# Patient Record
Sex: FEMALE | Race: AMERICAN INDIAN OR ALASKA NATIVE | NOT HISPANIC OR LATINO | Employment: FULL TIME | URBAN - METROPOLITAN AREA
[De-identification: names, ages, dates, MRNs, and addresses within clinical notes are randomized per-mention and may not be internally consistent; named-entity substitution may affect disease eponyms.]

---

## 2022-08-15 ENCOUNTER — APPOINTMENT (INPATIENT)
Dept: RADIOLOGY | Facility: HOSPITAL | Age: 72
DRG: 637 | End: 2022-08-15
Payer: COMMERCIAL

## 2022-08-15 ENCOUNTER — HOSPITAL ENCOUNTER (INPATIENT)
Facility: HOSPITAL | Age: 72
LOS: 3 days | Discharge: HOME HEALTH | DRG: 637 | End: 2022-08-18
Attending: EMERGENCY MEDICINE | Admitting: ANESTHESIOLOGY
Payer: COMMERCIAL

## 2022-08-15 ENCOUNTER — APPOINTMENT (EMERGENCY)
Dept: CT IMAGING | Facility: HOSPITAL | Age: 72
DRG: 637 | End: 2022-08-15
Payer: COMMERCIAL

## 2022-08-15 DIAGNOSIS — Z79.4 TYPE 2 DIABETES MELLITUS WITH HYPERGLYCEMIA, WITH LONG-TERM CURRENT USE OF INSULIN (HCC): Primary | ICD-10-CM

## 2022-08-15 DIAGNOSIS — E11.10 DKA (DIABETIC KETOACIDOSIS) (HCC): ICD-10-CM

## 2022-08-15 DIAGNOSIS — E11.65 TYPE 2 DIABETES MELLITUS WITH HYPERGLYCEMIA, WITH LONG-TERM CURRENT USE OF INSULIN (HCC): Primary | ICD-10-CM

## 2022-08-15 PROBLEM — N18.31 STAGE 3A CHRONIC KIDNEY DISEASE (HCC): Status: ACTIVE | Noted: 2018-01-31

## 2022-08-15 PROBLEM — N18.9 ACUTE KIDNEY INJURY SUPERIMPOSED ON CHRONIC KIDNEY DISEASE (HCC): Status: ACTIVE | Noted: 2022-08-15

## 2022-08-15 PROBLEM — E11.69 HYPERLIPIDEMIA ASSOCIATED WITH TYPE 2 DIABETES MELLITUS (HCC): Status: ACTIVE | Noted: 2018-01-31

## 2022-08-15 PROBLEM — I15.2 HYPERTENSION ASSOCIATED WITH DIABETES (HCC): Status: ACTIVE | Noted: 2018-01-31

## 2022-08-15 PROBLEM — E11.59 HYPERTENSION ASSOCIATED WITH DIABETES (HCC): Status: ACTIVE | Noted: 2018-01-31

## 2022-08-15 PROBLEM — I35.1 AORTIC INSUFFICIENCY: Status: ACTIVE | Noted: 2018-01-31

## 2022-08-15 PROBLEM — E78.5 HYPERLIPIDEMIA ASSOCIATED WITH TYPE 2 DIABETES MELLITUS (HCC): Status: ACTIVE | Noted: 2018-01-31

## 2022-08-15 PROBLEM — N17.9 ACUTE KIDNEY INJURY SUPERIMPOSED ON CHRONIC KIDNEY DISEASE (HCC): Status: ACTIVE | Noted: 2022-08-15

## 2022-08-15 LAB
2HR DELTA HS TROPONIN: 6 NG/L
ALBUMIN SERPL BCP-MCNC: 3.8 G/DL (ref 3.5–5)
ALBUMIN SERPL BCP-MCNC: 4 G/DL (ref 3.5–5)
ALP SERPL-CCNC: 72 U/L (ref 46–116)
ALP SERPL-CCNC: 74 U/L (ref 46–116)
ALT SERPL W P-5'-P-CCNC: 19 U/L (ref 12–78)
ALT SERPL W P-5'-P-CCNC: 22 U/L (ref 12–78)
AMPHETAMINES SERPL QL SCN: NEGATIVE
ANION GAP SERPL CALCULATED.3IONS-SCNC: 20 MMOL/L (ref 4–13)
ANION GAP SERPL CALCULATED.3IONS-SCNC: 22 MMOL/L (ref 4–13)
AST SERPL W P-5'-P-CCNC: 10 U/L (ref 5–45)
AST SERPL W P-5'-P-CCNC: 19 U/L (ref 5–45)
BACTERIA UR QL AUTO: NORMAL /HPF
BARBITURATES UR QL: NEGATIVE
BASE EXCESS BLDA CALC-SCNC: -8 MMOL/L (ref -2–3)
BASOPHILS # BLD AUTO: 0.03 THOUSANDS/ΜL (ref 0–0.1)
BASOPHILS NFR BLD AUTO: 0 % (ref 0–1)
BENZODIAZ UR QL: NEGATIVE
BETA-HYDROXYBUTYRATE: 6.3 MMOL/L
BILIRUB SERPL-MCNC: 0.29 MG/DL (ref 0.2–1)
BILIRUB SERPL-MCNC: 0.49 MG/DL (ref 0.2–1)
BILIRUB UR QL STRIP: NEGATIVE
BUN SERPL-MCNC: 47 MG/DL (ref 5–25)
BUN SERPL-MCNC: 50 MG/DL (ref 5–25)
CA-I BLD-SCNC: 1.04 MMOL/L (ref 1.12–1.32)
CALCIUM SERPL-MCNC: 8.7 MG/DL (ref 8.3–10.1)
CALCIUM SERPL-MCNC: 9.7 MG/DL (ref 8.3–10.1)
CARDIAC TROPONIN I PNL SERPL HS: 15 NG/L
CARDIAC TROPONIN I PNL SERPL HS: 9 NG/L
CHLORIDE SERPL-SCNC: 100 MMOL/L (ref 96–108)
CHLORIDE SERPL-SCNC: 96 MMOL/L (ref 96–108)
CLARITY UR: CLEAR
CO2 SERPL-SCNC: 19 MMOL/L (ref 21–32)
CO2 SERPL-SCNC: 20 MMOL/L (ref 21–32)
COCAINE UR QL: NEGATIVE
COLOR UR: YELLOW
CREAT SERPL-MCNC: 2.15 MG/DL (ref 0.6–1.3)
CREAT SERPL-MCNC: 2.3 MG/DL (ref 0.6–1.3)
EOSINOPHIL # BLD AUTO: 0 THOUSAND/ΜL (ref 0–0.61)
EOSINOPHIL NFR BLD AUTO: 0 % (ref 0–6)
ERYTHROCYTE [DISTWIDTH] IN BLOOD BY AUTOMATED COUNT: 13 % (ref 11.6–15.1)
EST. AVERAGE GLUCOSE BLD GHB EST-MCNC: 298 MG/DL
GFR SERPL CREATININE-BSD FRML MDRD: 20 ML/MIN/1.73SQ M
GFR SERPL CREATININE-BSD FRML MDRD: 22 ML/MIN/1.73SQ M
GLUCOSE SERPL-MCNC: 207 MG/DL (ref 65–140)
GLUCOSE SERPL-MCNC: 213 MG/DL (ref 65–140)
GLUCOSE SERPL-MCNC: 219 MG/DL (ref 65–140)
GLUCOSE SERPL-MCNC: 279 MG/DL (ref 65–140)
GLUCOSE SERPL-MCNC: 326 MG/DL (ref 65–140)
GLUCOSE SERPL-MCNC: 372 MG/DL (ref 65–140)
GLUCOSE SERPL-MCNC: 517 MG/DL (ref 65–140)
GLUCOSE SERPL-MCNC: 833 MG/DL (ref 65–140)
GLUCOSE SERPL-MCNC: >500 MG/DL (ref 65–140)
GLUCOSE SERPL-MCNC: >500 MG/DL (ref 65–140)
GLUCOSE SERPL-MCNC: >600 MG/DL (ref 65–140)
GLUCOSE UR STRIP-MCNC: ABNORMAL MG/DL
HBA1C MFR BLD: 12 %
HCO3 BLDA-SCNC: 17.6 MMOL/L (ref 24–30)
HCT VFR BLD AUTO: 44.8 % (ref 34.8–46.1)
HCT VFR BLD CALC: 37 % (ref 34.8–46.1)
HGB BLD-MCNC: 14 G/DL (ref 11.5–15.4)
HGB BLDA-MCNC: 12.6 G/DL (ref 11.5–15.4)
HGB UR QL STRIP.AUTO: ABNORMAL
IMM GRANULOCYTES # BLD AUTO: 0.04 THOUSAND/UL (ref 0–0.2)
IMM GRANULOCYTES NFR BLD AUTO: 0 % (ref 0–2)
INR PPP: 1.27 (ref 0.84–1.19)
KETONES UR STRIP-MCNC: ABNORMAL MG/DL
LEUKOCYTE ESTERASE UR QL STRIP: ABNORMAL
LYMPHOCYTES # BLD AUTO: 1.03 THOUSANDS/ΜL (ref 0.6–4.47)
LYMPHOCYTES NFR BLD AUTO: 9 % (ref 14–44)
MAGNESIUM SERPL-MCNC: 2.9 MG/DL (ref 1.6–2.6)
MCH RBC QN AUTO: 27.9 PG (ref 26.8–34.3)
MCHC RBC AUTO-ENTMCNC: 31.3 G/DL (ref 31.4–37.4)
MCV RBC AUTO: 89 FL (ref 82–98)
METHADONE UR QL: NEGATIVE
MONOCYTES # BLD AUTO: 0.21 THOUSAND/ΜL (ref 0.17–1.22)
MONOCYTES NFR BLD AUTO: 2 % (ref 4–12)
NEUTROPHILS # BLD AUTO: 9.93 THOUSANDS/ΜL (ref 1.85–7.62)
NEUTS SEG NFR BLD AUTO: 89 % (ref 43–75)
NITRITE UR QL STRIP: NEGATIVE
NON-SQ EPI CELLS URNS QL MICRO: NORMAL /HPF
NRBC BLD AUTO-RTO: 0 /100 WBCS
OPIATES UR QL SCN: NEGATIVE
OXYCODONE+OXYMORPHONE UR QL SCN: NEGATIVE
PCO2 BLD: 19 MMOL/L (ref 21–32)
PCO2 BLD: 36 MM HG (ref 42–50)
PCP UR QL: NEGATIVE
PH BLD: 7.3 [PH] (ref 7.3–7.4)
PH UR STRIP.AUTO: 5 [PH]
PHOSPHATE SERPL-MCNC: 4.9 MG/DL (ref 2.3–4.1)
PLATELET # BLD AUTO: 179 THOUSANDS/UL (ref 149–390)
PO2 BLD: 37 MM HG (ref 35–45)
POTASSIUM BLD-SCNC: 4.8 MMOL/L (ref 3.5–5.3)
POTASSIUM SERPL-SCNC: 3.9 MMOL/L (ref 3.5–5.3)
POTASSIUM SERPL-SCNC: 4.7 MMOL/L (ref 3.5–5.3)
PROCALCITONIN SERPL-MCNC: 0.44 NG/ML
PROT SERPL-MCNC: 8 G/DL (ref 6.4–8.4)
PROT SERPL-MCNC: 8.5 G/DL (ref 6.4–8.4)
PROT UR STRIP-MCNC: NEGATIVE MG/DL
PROTHROMBIN TIME: 15.6 SECONDS (ref 11.6–14.5)
RBC # BLD AUTO: 5.01 MILLION/UL (ref 3.81–5.12)
RBC #/AREA URNS AUTO: NORMAL /HPF
SAO2 % BLD FROM PO2: 65 % (ref 60–85)
SODIUM BLD-SCNC: 137 MMOL/L (ref 136–145)
SODIUM SERPL-SCNC: 137 MMOL/L (ref 135–147)
SODIUM SERPL-SCNC: 140 MMOL/L (ref 135–147)
SP GR UR STRIP.AUTO: 1.02 (ref 1–1.03)
SPECIMEN SOURCE: ABNORMAL
THC UR QL: NEGATIVE
UROBILINOGEN UR QL STRIP.AUTO: 0.2 E.U./DL
WBC # BLD AUTO: 11.24 THOUSAND/UL (ref 4.31–10.16)
WBC #/AREA URNS AUTO: NORMAL /HPF

## 2022-08-15 PROCEDURE — 80053 COMPREHEN METABOLIC PANEL: CPT | Performed by: EMERGENCY MEDICINE

## 2022-08-15 PROCEDURE — 85610 PROTHROMBIN TIME: CPT | Performed by: EMERGENCY MEDICINE

## 2022-08-15 PROCEDURE — 80053 COMPREHEN METABOLIC PANEL: CPT | Performed by: ANESTHESIOLOGY

## 2022-08-15 PROCEDURE — 82947 ASSAY GLUCOSE BLOOD QUANT: CPT

## 2022-08-15 PROCEDURE — 93005 ELECTROCARDIOGRAM TRACING: CPT

## 2022-08-15 PROCEDURE — 83735 ASSAY OF MAGNESIUM: CPT | Performed by: EMERGENCY MEDICINE

## 2022-08-15 PROCEDURE — 82948 REAGENT STRIP/BLOOD GLUCOSE: CPT

## 2022-08-15 PROCEDURE — 84132 ASSAY OF SERUM POTASSIUM: CPT

## 2022-08-15 PROCEDURE — 70450 CT HEAD/BRAIN W/O DYE: CPT

## 2022-08-15 PROCEDURE — 82010 KETONE BODYS QUAN: CPT | Performed by: EMERGENCY MEDICINE

## 2022-08-15 PROCEDURE — 99291 CRITICAL CARE FIRST HOUR: CPT | Performed by: ANESTHESIOLOGY

## 2022-08-15 PROCEDURE — 80307 DRUG TEST PRSMV CHEM ANLYZR: CPT | Performed by: ANESTHESIOLOGY

## 2022-08-15 PROCEDURE — 85014 HEMATOCRIT: CPT

## 2022-08-15 PROCEDURE — 96365 THER/PROPH/DIAG IV INF INIT: CPT

## 2022-08-15 PROCEDURE — 71045 X-RAY EXAM CHEST 1 VIEW: CPT

## 2022-08-15 PROCEDURE — 81001 URINALYSIS AUTO W/SCOPE: CPT | Performed by: EMERGENCY MEDICINE

## 2022-08-15 PROCEDURE — 84295 ASSAY OF SERUM SODIUM: CPT

## 2022-08-15 PROCEDURE — 83036 HEMOGLOBIN GLYCOSYLATED A1C: CPT | Performed by: EMERGENCY MEDICINE

## 2022-08-15 PROCEDURE — 96361 HYDRATE IV INFUSION ADD-ON: CPT

## 2022-08-15 PROCEDURE — 96374 THER/PROPH/DIAG INJ IV PUSH: CPT

## 2022-08-15 PROCEDURE — 84145 PROCALCITONIN (PCT): CPT | Performed by: ANESTHESIOLOGY

## 2022-08-15 PROCEDURE — 99285 EMERGENCY DEPT VISIT HI MDM: CPT

## 2022-08-15 PROCEDURE — 84484 ASSAY OF TROPONIN QUANT: CPT | Performed by: EMERGENCY MEDICINE

## 2022-08-15 PROCEDURE — 84100 ASSAY OF PHOSPHORUS: CPT | Performed by: EMERGENCY MEDICINE

## 2022-08-15 PROCEDURE — 36415 COLL VENOUS BLD VENIPUNCTURE: CPT | Performed by: EMERGENCY MEDICINE

## 2022-08-15 PROCEDURE — 85025 COMPLETE CBC W/AUTO DIFF WBC: CPT | Performed by: EMERGENCY MEDICINE

## 2022-08-15 PROCEDURE — 99285 EMERGENCY DEPT VISIT HI MDM: CPT | Performed by: EMERGENCY MEDICINE

## 2022-08-15 PROCEDURE — 82803 BLOOD GASES ANY COMBINATION: CPT

## 2022-08-15 PROCEDURE — 82330 ASSAY OF CALCIUM: CPT

## 2022-08-15 RX ORDER — HEPARIN SODIUM 5000 [USP'U]/ML
5000 INJECTION, SOLUTION INTRAVENOUS; SUBCUTANEOUS EVERY 8 HOURS SCHEDULED
Status: DISCONTINUED | OUTPATIENT
Start: 2022-08-15 | End: 2022-08-17

## 2022-08-15 RX ORDER — ENALAPRIL MALEATE 20 MG/1
20 TABLET ORAL DAILY
COMMUNITY

## 2022-08-15 RX ORDER — SODIUM CHLORIDE, SODIUM GLUCONATE, SODIUM ACETATE, POTASSIUM CHLORIDE, MAGNESIUM CHLORIDE, SODIUM PHOSPHATE, DIBASIC, AND POTASSIUM PHOSPHATE .53; .5; .37; .037; .03; .012; .00082 G/100ML; G/100ML; G/100ML; G/100ML; G/100ML; G/100ML; G/100ML
1000 INJECTION, SOLUTION INTRAVENOUS ONCE
Status: COMPLETED | OUTPATIENT
Start: 2022-08-15 | End: 2022-08-15

## 2022-08-15 RX ORDER — HYDROCHLOROTHIAZIDE 12.5 MG/1
12.5 CAPSULE, GELATIN COATED ORAL DAILY
COMMUNITY

## 2022-08-15 RX ORDER — SODIUM CHLORIDE 9 MG/ML
3 INJECTION INTRAVENOUS
Status: DISCONTINUED | OUTPATIENT
Start: 2022-08-15 | End: 2022-08-18 | Stop reason: HOSPADM

## 2022-08-15 RX ORDER — AMLODIPINE BESYLATE 10 MG/1
10 TABLET ORAL DAILY
COMMUNITY

## 2022-08-15 RX ORDER — AMLODIPINE BESYLATE 10 MG/1
10 TABLET ORAL DAILY
Status: DISCONTINUED | OUTPATIENT
Start: 2022-08-16 | End: 2022-08-18 | Stop reason: HOSPADM

## 2022-08-15 RX ORDER — DEXTROSE AND SODIUM CHLORIDE 5; .9 G/100ML; G/100ML
250 INJECTION, SOLUTION INTRAVENOUS CONTINUOUS
Status: DISCONTINUED | OUTPATIENT
Start: 2022-08-15 | End: 2022-08-15

## 2022-08-15 RX ORDER — CARVEDILOL 12.5 MG/1
25 TABLET ORAL 2 TIMES DAILY WITH MEALS
Status: DISCONTINUED | OUTPATIENT
Start: 2022-08-15 | End: 2022-08-18 | Stop reason: HOSPADM

## 2022-08-15 RX ORDER — CARVEDILOL PHOSPHATE 10 MG/1
25 CAPSULE, EXTENDED RELEASE ORAL 2 TIMES DAILY
COMMUNITY

## 2022-08-15 RX ORDER — HYDRALAZINE HYDROCHLORIDE 20 MG/ML
5 INJECTION INTRAMUSCULAR; INTRAVENOUS EVERY 6 HOURS PRN
Status: DISCONTINUED | OUTPATIENT
Start: 2022-08-15 | End: 2022-08-15

## 2022-08-15 RX ORDER — SIMVASTATIN 20 MG
20 TABLET ORAL
COMMUNITY

## 2022-08-15 RX ORDER — HYDRALAZINE HYDROCHLORIDE 20 MG/ML
5 INJECTION INTRAMUSCULAR; INTRAVENOUS EVERY 6 HOURS PRN
Status: DISCONTINUED | OUTPATIENT
Start: 2022-08-15 | End: 2022-08-18 | Stop reason: HOSPADM

## 2022-08-15 RX ORDER — PRAVASTATIN SODIUM 40 MG
40 TABLET ORAL
Status: DISCONTINUED | OUTPATIENT
Start: 2022-08-15 | End: 2022-08-18 | Stop reason: HOSPADM

## 2022-08-15 RX ORDER — AMLODIPINE BESYLATE 5 MG/1
5 TABLET ORAL DAILY
Status: DISCONTINUED | OUTPATIENT
Start: 2022-08-16 | End: 2022-08-15

## 2022-08-15 RX ORDER — DEXTROSE AND SODIUM CHLORIDE 5; .45 G/100ML; G/100ML
150 INJECTION, SOLUTION INTRAVENOUS CONTINUOUS
Status: DISCONTINUED | OUTPATIENT
Start: 2022-08-15 | End: 2022-08-16

## 2022-08-15 RX ORDER — SODIUM CHLORIDE, SODIUM LACTATE, POTASSIUM CHLORIDE, CALCIUM CHLORIDE 600; 310; 30; 20 MG/100ML; MG/100ML; MG/100ML; MG/100ML
250 INJECTION, SOLUTION INTRAVENOUS CONTINUOUS
Status: DISCONTINUED | OUTPATIENT
Start: 2022-08-15 | End: 2022-08-16

## 2022-08-15 RX ORDER — SODIUM CHLORIDE, SODIUM LACTATE, POTASSIUM CHLORIDE, CALCIUM CHLORIDE 600; 310; 30; 20 MG/100ML; MG/100ML; MG/100ML; MG/100ML
500 INJECTION, SOLUTION INTRAVENOUS CONTINUOUS
Status: DISCONTINUED | OUTPATIENT
Start: 2022-08-15 | End: 2022-08-15

## 2022-08-15 RX ADMIN — HEPARIN SODIUM 5000 UNITS: 5000 INJECTION INTRAVENOUS; SUBCUTANEOUS at 16:55

## 2022-08-15 RX ADMIN — SODIUM CHLORIDE 1000 ML: 0.9 INJECTION, SOLUTION INTRAVENOUS at 14:07

## 2022-08-15 RX ADMIN — SODIUM CHLORIDE 6.9 UNITS/HR: 9 INJECTION, SOLUTION INTRAVENOUS at 16:52

## 2022-08-15 RX ADMIN — SODIUM CHLORIDE, SODIUM GLUCONATE, SODIUM ACETATE, POTASSIUM CHLORIDE, MAGNESIUM CHLORIDE, SODIUM PHOSPHATE, DIBASIC, AND POTASSIUM PHOSPHATE 1000 ML: .53; .5; .37; .037; .03; .012; .00082 INJECTION, SOLUTION INTRAVENOUS at 14:07

## 2022-08-15 RX ADMIN — SODIUM CHLORIDE, SODIUM LACTATE, POTASSIUM CHLORIDE, AND CALCIUM CHLORIDE 500 ML/HR: 600; 310; 30; 20 INJECTION, SOLUTION INTRAVENOUS at 17:01

## 2022-08-15 RX ADMIN — SODIUM CHLORIDE, SODIUM LACTATE, POTASSIUM CHLORIDE, AND CALCIUM CHLORIDE 500 ML/HR: 600; 310; 30; 20 INJECTION, SOLUTION INTRAVENOUS at 19:57

## 2022-08-15 RX ADMIN — SODIUM CHLORIDE 6.9 UNITS/HR: 9 INJECTION, SOLUTION INTRAVENOUS at 15:34

## 2022-08-15 RX ADMIN — PRAVASTATIN SODIUM 40 MG: 40 TABLET ORAL at 16:54

## 2022-08-15 RX ADMIN — DIBASIC SODIUM PHOSPHATE, MONOBASIC POTASSIUM PHOSPHATE AND MONOBASIC SODIUM PHOSPHATE 1 TABLET: 852; 155; 130 TABLET ORAL at 18:19

## 2022-08-15 RX ADMIN — DEXTROSE AND SODIUM CHLORIDE 250 ML/HR: 5; .45 INJECTION, SOLUTION INTRAVENOUS at 21:55

## 2022-08-15 RX ADMIN — CARVEDILOL 25 MG: 12.5 TABLET, FILM COATED ORAL at 16:54

## 2022-08-15 NOTE — ASSESSMENT & PLAN NOTE
Lab Results   Component Value Date    EGFR 20 08/15/2022    CREATININE 2 30 (H) 08/15/2022   Baseline Cr 1 21 (July 2022)     Likely due to diabetic emergency, continue hydration monitor urine output, acid-base status, and check renal US if does not improve with hydration  Hold ACE, diuretic

## 2022-08-15 NOTE — PLAN OF CARE
Problem: Potential for Falls  Goal: Patient will remain free of falls  Description: INTERVENTIONS:  - Educate patient/family on patient safety including physical limitations  - Instruct patient to call for assistance with activity   - Consult OT/PT to assist with strengthening/mobility   - Keep Call bell within reach  - Keep bed low and locked with side rails adjusted as appropriate  - Keep care items and personal belongings within reach  - Initiate and maintain comfort rounds  - Make Fall Risk Sign visible to staff  - Offer Toileting every 3 Hours, in advance of need  - Initiate/Maintain bed/chair alarm  - Obtain necessary fall risk management equipment:  - Apply yellow socks and bracelet for high fall risk patients  - Consider moving patient to room near nurses station  Outcome: Progressing     Problem: PAIN - ADULT  Goal: Verbalizes/displays adequate comfort level or baseline comfort level  Description: Interventions:  - Encourage patient to monitor pain and request assistance  - Assess pain using appropriate pain scale  - Administer analgesics based on type and severity of pain and evaluate response  - Implement non-pharmacological measures as appropriate and evaluate response  - Consider cultural and social influences on pain and pain management  - Notify physician/advanced practitioner if interventions unsuccessful or patient reports new pain  Outcome: Progressing     Problem: INFECTION - ADULT  Goal: Absence or prevention of progression during hospitalization  Description: INTERVENTIONS:  - Assess and monitor for signs and symptoms of infection  - Monitor lab/diagnostic results  - Monitor all insertion sites, i e  indwelling lines, tubes, and drains  - Monitor endotracheal if appropriate and nasal secretions for changes in amount and color  - [place] appropriate cooling/warming therapies per order  - Administer medications as ordered  - Instruct and encourage patient and family to use good hand hygiene technique  - Identify and instruct in appropriate isolation precautions for identified infection/condition  Outcome: Progressing     Problem: SAFETY ADULT  Goal: Patient will remain free of falls  Description: INTERVENTIONS:  - Educate patient/family on patient safety including physical limitations  - Instruct patient to call for assistance with activity   - Consult OT/PT to assist with strengthening/mobility   - Keep Call bell within reach  - Keep bed low and locked with side rails adjusted as appropriate  - Keep care items and personal belongings within reach  - Initiate and maintain comfort rounds  - Make Fall Risk Sign visible to staff  - Offer Toileting every 3Hours, in advance of need  - Initiate/Maintain bed/chair alarm  - Obtain necessary fall risk management equipment:  - Apply yellow socks and bracelet for high fall risk patients  - Consider moving patient to room near nurses station  Outcome: Progressing  Goal: Maintain or return to baseline ADL function  Description: INTERVENTIONS:  -  Assess patient's ability to carry out ADLs; assess patient's baseline for ADL function and identify physical deficits which impact ability to perform ADLs (bathing, care of mouth/teeth, toileting, grooming, dressing, etc )  - Assess/evaluate cause of self-care deficits   - Assess range of motion  - Assess patient's mobility; develop plan if impaired  - Assess patient's need for assistive devices and provide as appropriate  - Encourage maximum independence but intervene and supervise when necessary  - Involve family in performance of ADLs  - Assess for home care needs following discharge   - Consider OT consult to assist with ADL evaluation and planning for discharge  - Provide patient education as appropriate  Outcome: Progressing  Goal: Maintains/Returns to pre admission functional level  Description: INTERVENTIONS:  - Perform BMAT or MOVE assessment daily    - Set and communicate daily mobility goal to care team and patient/family/caregiver  - Collaborate with rehabilitation services on mobility goals if consulted  - Reposition patient every 2 hours  - Dangle patient 3 times a day  - Stand patient 3 times a day  - Ambulate patient 3 times a day  - Out of bed to chair 3 times a day   - Out of bed for meals 3 times a day  - Out of bed for toileting  - Record patient progress and toleration of activity level   Outcome: Progressing     Problem: DISCHARGE PLANNING  Goal: Discharge to home or other facility with appropriate resources  Description: INTERVENTIONS:  - Identify barriers to discharge w/patient and caregiver  - Arrange for needed discharge resources and transportation as appropriate  - Identify discharge learning needs (meds, wound care, etc )  - Arrange for interpretive services to assist at discharge as needed  - Refer to Case Management Department for coordinating discharge planning if the patient needs post-hospital services based on physician/advanced practitioner order or complex needs related to functional status, cognitive ability, or social support system  Outcome: Progressing     Problem: Knowledge Deficit  Goal: Patient/family/caregiver demonstrates understanding of disease process, treatment plan, medications, and discharge instructions  Description: Complete learning assessment and assess knowledge base    Interventions:  - Provide teaching at level of understanding  - Provide teaching via preferred learning methods  Outcome: Progressing     Problem: METABOLIC, FLUID AND ELECTROLYTES - ADULT  Goal: Glucose maintained within target range  Description: INTERVENTIONS:  - Monitor Blood Glucose as ordered  - Assess for signs and symptoms of hyperglycemia and hypoglycemia  - Administer ordered medications to maintain glucose within target range  - Assess nutritional intake and initiate nutrition service referral as needed  Outcome: Progressing

## 2022-08-15 NOTE — ED PROVIDER NOTES
History  Chief Complaint   Patient presents with    Hyperglycemia - Symptomatic     Patient arrived by EMS for hyperglycemia  Patient was diagnosed as a diabetic last week and just started oral medications  Patient unsure of medication  66year old female presents emergency department for evaluation of altered mental status, generalized weakness and fatigue with a known new diagnosis of diabetes  The patient is being treated currently with one 500 mg dose of Glucophage daily  Patient was found to be hyperglycemic at home, increasingly weak, near syncopal and brought here to the emergency department  Patient is likely in DKA  History provided by:  Patient   used: No    Hyperglycemia - Symptomatic  Severity:  Mild  Onset quality:  Gradual  Timing:  Constant  Progression:  Worsening  Chronicity:  New  Context: new diabetes diagnosis    Relieved by:  Nothing  Ineffective treatments:  None tried  Associated symptoms: no chest pain, no dehydration, no fatigue, no increased appetite and no syncope        Prior to Admission Medications   Prescriptions Last Dose Informant Patient Reported? Taking? amLODIPine (NORVASC) 10 mg tablet   Yes Yes   Sig: Take 10 mg by mouth daily   carvedilol (COREG CR) 10 MG 24 hr capsule   Yes Yes   Sig: Take 25 mg by mouth 2 (two) times a day   enalapril (VASOTEC) 20 mg tablet   Yes Yes   Sig: Take 20 mg by mouth daily   hydrochlorothiazide (MICROZIDE) 12 5 mg capsule   Yes Yes   Sig: Take 12 5 mg by mouth daily   metFORMIN (GLUCOPHAGE) 500 mg tablet   Yes Yes   Sig: Take 500 mg by mouth in the morning   simvastatin (ZOCOR) 20 mg tablet   Yes Yes   Sig: Take 20 mg by mouth daily at bedtime      Facility-Administered Medications: None       Past Medical History:   Diagnosis Date    Diabetes mellitus ([facility] [address] )     Hypertension        History reviewed  No pertinent surgical history  History reviewed  No pertinent family history    I have reviewed and agree with the history as documented  E-Cigarette/Vaping    E-Cigarette Use Never User      E-Cigarette/Vaping Substances     Social History     Tobacco Use    Smoking status: Never Smoker    Smokeless tobacco: Never Used   Vaping Use    Vaping Use: Never used   Substance Use Topics    Alcohol use: Never    Drug use: Never       Review of Systems   Constitutional: Negative for fatigue  Cardiovascular: Negative for chest pain and syncope  All other systems reviewed and are negative  Physical Exam  Physical Exam  Vitals and nursing note reviewed  Constitutional:       Appearance: She is well-developed  HENT:      Head: Normocephalic and atraumatic  Right Ear: External ear normal       Left Ear: External ear normal    Eyes:      Conjunctiva/sclera: Conjunctivae normal    Neck:      Thyroid: No thyromegaly  Vascular: No JVD  Trachea: No tracheal deviation  Cardiovascular:      Rate and Rhythm: Normal rate  Pulmonary:      Effort: Pulmonary effort is normal       Breath sounds: Normal breath sounds  No stridor  Abdominal:      General: There is no distension  Palpations: Abdomen is soft  There is no mass  Tenderness: There is no abdominal tenderness  There is no guarding  Hernia: No hernia is present  Musculoskeletal:         General: No tenderness or deformity  Normal range of motion  Lymphadenopathy:      Cervical: No cervical adenopathy  Skin:     General: Skin is warm  Coloration: Skin is not pale  Findings: No erythema or rash  Neurological:      Mental Status: She is alert and oriented to person, place, and time     Psychiatric:         Behavior: Behavior normal          Vital Signs  ED Triage Vitals   Temperature Pulse Respirations Blood Pressure SpO2   08/15/22 1348 08/15/22 1343 08/15/22 1343 08/15/22 1343 08/15/22 1343   97 9 °F (36 6 °C) 76 18 (!) 190/90 97 %      Temp Source Heart Rate Source Patient Position - Orthostatic VS BP Location FiO2 (%) 08/15/22 1738 08/15/22 1541 08/15/22 1541 08/15/22 1541 --   Oral Monitor Lying Left arm       Pain Score       08/15/22 1738       No Pain           Vitals:    08/17/22 0500 08/17/22 0845 08/17/22 1100 08/17/22 1500   BP: 160/70 162/72 135/64 166/74   Pulse: 63 63 61 70   Patient Position - Orthostatic VS: Lying  Sitting Lying         Visual Acuity      ED Medications  Medications   sodium chloride (PF) 0 9 % injection 3 mL (has no administration in time range)   carvedilol (COREG) tablet 25 mg (25 mg Oral Given 8/17/22 0846)   pravastatin (PRAVACHOL) tablet 40 mg (40 mg Oral Given 8/16/22 1558)   potassium-sodium phosphateS (K-PHOS,PHOSPHA 250) -250 mg tablet 1 tablet (1 tablet Oral Given 8/17/22 0845)   heparin (porcine) subcutaneous injection 5,000 Units (5,000 Units Subcutaneous Given 8/17/22 1426)   amLODIPine (NORVASC) tablet 10 mg (10 mg Oral Given 8/17/22 0845)   hydrALAZINE (APRESOLINE) injection 5 mg (has no administration in time range)   insulin regular (HumuLIN R,NovoLIN R) 1 Units/mL in sodium chloride 0 9 % 100 mL infusion (0 Units/hr Intravenous Stopped 8/16/22 2356)   glimepiride (AMARYL) tablet 4 mg (4 mg Oral Given 8/17/22 0845)   insulin glargine (LANTUS) subcutaneous injection 15 Units 0 15 mL (15 Units Subcutaneous Given 8/16/22 2158)   insulin lispro (HumaLOG) 100 units/mL subcutaneous injection 1-5 Units (3 Units Subcutaneous Given 8/17/22 1201)   insulin lispro (HumaLOG) 100 units/mL subcutaneous injection 1-5 Units (has no administration in time range)   sodium chloride 0 9 % bolus 1,000 mL (0 mL Intravenous Stopped 8/15/22 1530)   multi-electrolyte (ISOLYTE-S PH 7 4) bolus 1,000 mL (0 mL Intravenous Stopped 8/15/22 1530)   potassium chloride 20 mEq IVPB (premix) (20 mEq Intravenous New Bag 8/16/22 0258)   calcium gluconate 2 g in sodium chloride 0 9% 100 mL (premix) (2 g Intravenous New Bag 8/16/22 8668)       Diagnostic Studies  Results Reviewed     Procedure Component Value Units Date/Time    Basic metabolic panel [[id number]]  (Abnormal) Collected: 08/16/22 0005    Lab Status: Final result Specimen: Blood from Hand, Left Updated: 08/16/22 0035     Sodium 143 mmol/L      Potassium 3 3 mmol/L      Chloride 109 mmol/L      CO2 26 mmol/L      ANION GAP 8 mmol/L      BUN 37 mg/dL      Creatinine 1 56 mg/dL      Glucose 209 mg/dL      Calcium 8 5 mg/dL      eGFR 32 ml/min/1 73sq m     Narrative:      [place] guidelines for Chronic Kidney Disease (CKD):     Stage 1 with normal or high GFR (GFR > 90 mL/min/1 73 square meters)    Stage 2 Mild CKD (GFR = 60-89 mL/min/1 73 square meters)    Stage 3A Moderate CKD (GFR = 45-59 mL/min/1 73 square meters)    Stage 3B Moderate CKD (GFR = 30-44 mL/min/1 73 square meters)    Stage 4 Severe CKD (GFR = 15-29 mL/min/1 73 square meters)    Stage 5 End Stage CKD (GFR <15 mL/min/1 73 square meters)  Note: GFR calculation is accurate only with a steady state creatinine    Phosphorus [[id number]]  (Normal) Collected: 08/16/22 0005    Lab Status: Final result Specimen: Blood from Hand, Left Updated: 08/16/22 0035     Phosphorus 2 8 mg/dL     Magnesium [[id number]]  (Abnormal) Collected: 08/16/22 0005    Lab Status: Final result Specimen: Blood from Hand, Left Updated: 08/16/22 0035     Magnesium 2 7 mg/dL     Hemoglobin A1c w/EAG Estimation [[id number]]  (Abnormal) Collected: 08/15/22 1614    Lab Status: Final result Specimen: Blood from Arm, Left Updated: 08/15/22 2046     Hemoglobin A1C 12 0 %       mg/dl     Procalcitonin [[id number]]  (Abnormal) Collected: 08/15/22 1657    Lab Status: Final result Specimen: Blood from Arm, Left Updated: 08/15/22 1740     Procalcitonin 0 44 ng/ml     Comprehensive metabolic panel [[id number]]  (Abnormal) Collected: 08/15/22 1657    Lab Status: Final result Specimen: Blood from Arm, Left Updated: 08/15/22 1735     Sodium 140 mmol/L      Potassium 3 9 mmol/L      Chloride 100 mmol/L      CO2 20 mmol/L      ANION GAP 20 mmol/L      BUN 47 mg/dL      Creatinine 2 15 mg/dL      Glucose 517 mg/dL      Calcium 8 7 mg/dL      AST 19 U/L      ALT 22 U/L      Alkaline Phosphatase 72 U/L      Total Protein 8 0 g/dL      Albumin 3 8 g/dL      Total Bilirubin 0 29 mg/dL      eGFR 22 ml/min/1 73sq m     Narrative:      [place] guidelines for Chronic Kidney Disease (CKD):     Stage 1 with normal or high GFR (GFR > 90 mL/min/1 73 square meters)    Stage 2 Mild CKD (GFR = 60-89 mL/min/1 73 square meters)    Stage 3A Moderate CKD (GFR = 45-59 mL/min/1 73 square meters)    Stage 3B Moderate CKD (GFR = 30-44 mL/min/1 73 square meters)    Stage 4 Severe CKD (GFR = 15-29 mL/min/1 73 square meters)    Stage 5 End Stage CKD (GFR <15 mL/min/1 73 square meters)  Note: GFR calculation is accurate only with a steady state creatinine    Rapid drug screen, urine [[id number]]  (Normal) Collected: 08/15/22 1649    Lab Status: Final result Specimen: Urine Updated: 08/15/22 1707     Amph/Meth UR Negative     Barbiturate Ur Negative     Benzodiazepine Urine Negative     Cocaine Urine Negative     Methadone Urine Negative     Opiate Urine Negative     PCP Ur Negative     THC Urine Negative     Oxycodone Urine Negative    Narrative:      FOR MEDICAL PURPOSES ONLY  IF CONFIRMATION NEEDED PLEASE CONTACT THE LAB WITHIN 5 DAYS      Drug Screen Cutoff Levels:  AMPHETAMINE/METHAMPHETAMINES  1000 ng/mL  BARBITURATES     200 ng/mL  BENZODIAZEPINES     200 ng/mL  COCAINE      300 ng/mL  METHADONE      300 ng/mL  OPIATES      300 ng/mL  PHENCYCLIDINE     25 ng/mL  THC       50 ng/mL  OXYCODONE      100 ng/mL    HS Troponin I 2hr [[id number]]  (Normal) Collected: 08/15/22 1614    Lab Status: Final result Specimen: Blood from Arm, Left Updated: 08/15/22 1651     hs TnI 2hr 15 ng/L      Delta 2hr hsTnI 6 ng/L     Magnesium [[id number]]  (Abnormal) Collected: 08/15/22 1614    Lab Status: Final result Specimen: Blood from Arm, Left Updated: 08/15/22 1641     Magnesium 2 9 mg/dL     Phosphorus [[id number]]  (Abnormal) Collected: 08/15/22 1614    Lab Status: Final result Specimen: Blood from Arm, Left Updated: 08/15/22 1641     Phosphorus 4 9 mg/dL     Fingerstick Glucose (POCT) [[id number]]  (Abnormal) Collected: 08/15/22 1635    Lab Status: Final result Updated: 08/15/22 1640     POC Glucose >500 mg/dl     Comprehensive metabolic panel [[id number]]  (Abnormal) Collected: 08/15/22 1405    Lab Status: Final result Specimen: Blood from Arm, Left Updated: 08/15/22 1507     Sodium 137 mmol/L      Potassium 4 7 mmol/L      Chloride 96 mmol/L      CO2 19 mmol/L      ANION GAP 22 mmol/L      BUN 50 mg/dL      Creatinine 2 30 mg/dL      Glucose 833 mg/dL      Calcium 9 7 mg/dL      AST 10 U/L      ALT 19 U/L      Alkaline Phosphatase 74 U/L      Total Protein 8 5 g/dL      Albumin 4 0 g/dL      Total Bilirubin 0 49 mg/dL      eGFR 20 ml/min/1 73sq m     Narrative:      [place] guidelines for Chronic Kidney Disease (CKD):     Stage 1 with normal or high GFR (GFR > 90 mL/min/1 73 square meters)    Stage 2 Mild CKD (GFR = 60-89 mL/min/1 73 square meters)    Stage 3A Moderate CKD (GFR = 45-59 mL/min/1 73 square meters)    Stage 3B Moderate CKD (GFR = 30-44 mL/min/1 73 square meters)    Stage 4 Severe CKD (GFR = 15-29 mL/min/1 73 square meters)    Stage 5 End Stage CKD (GFR <15 mL/min/1 73 square meters)  Note: GFR calculation is accurate only with a steady state creatinine    Urine Microscopic [[id number]]  (Normal) Collected: 08/15/22 1441    Lab Status: Final result Specimen: Urine, Clean Catch Updated: 08/15/22 1454     RBC, UA None Seen /hpf      WBC, UA None Seen /hpf      Epithelial Cells Occasional /hpf      Bacteria, UA None Seen /hpf     UA w Reflex to Microscopic w Reflex to Culture [[id number]]  (Abnormal) Collected: 08/15/22 1441    Lab Status: Final result Specimen: Urine, Clean Catch Updated: 08/15/22 1446     Color, UA Yellow     Clarity, UA Clear     Specific [place], UA 1 020     pH, UA 5 0     Leukocytes, UA Elevated glucose may cause decreased leukocyte values   See urine microscopic for [facility] result/     Nitrite, UA Negative     Protein, UA Negative mg/dl      Glucose, UA >=1000 (1%) mg/dl      Ketones, UA 40 (2+) mg/dl      Urobilinogen, UA 0 2 E U /dl      Bilirubin, UA Negative     Occult Blood, UA Trace-Intact    HS Troponin 0hr (reflex protocol) [[id number]]  (Normal) Collected: 08/15/22 1405    Lab Status: Final result Specimen: Blood from Arm, Left Updated: 08/15/22 1441     hs TnI 0hr 9 ng/L     POCT Blood Gas (CG8+) [[id number]]  (Abnormal) Collected: 08/15/22 1426    Lab Status: Final result Specimen: Venous Updated: 08/15/22 1430     ph, [place] ISTAT 7 298     pCO2, [place] i-STAT 36 0 mm HG      pO2, [place] i-STAT 37 0 mm HG      BE, i-STAT -8 mmol/L      HCO3, [place] i-STAT 17 6 mmol/L      CO2, i-STAT 19 mmol/L      O2 Sat, i-STAT 65 %      SODIUM, I-STAT 137 mmol/l      Potassium, i-STAT 4 8 mmol/L      Calcium, Ionized i-STAT 1 04 mmol/L      Hct, i-STAT 37 %      Hgb, i-STAT 12 6 g/dl      Glucose, i-STAT >600 mg/dl      Specimen Type VENOUS    Beta Hydroxybutyrate [[id number]]  (Abnormal) Collected: 08/15/22 1405    Lab Status: Final result Specimen: Blood from Arm, Left Updated: 08/15/22 1426     BETA-HYDROXYBUTYRATE 6 3 mmol/L     Protime-INR [[id number]]  (Abnormal) Collected: 08/15/22 1405    Lab Status: Final result Specimen: Blood from Arm, Left Updated: 08/15/22 1424     Protime 15 6 seconds      INR 1 27    CBC and differential [[id number]]  (Abnormal) Collected: 08/15/22 1405    Lab Status: Final result Specimen: Blood from Arm, Left Updated: 08/15/22 1410     WBC 11 24 Thousand/uL      RBC 5 01 Million/uL      Hemoglobin 14 0 g/dL      Hematocrit 44 8 %      MCV 89 fL      MCH 27 9 pg      MCHC 31 3 g/dL      RDW 13 0 %      Platelets 184 Thousands/uL      nRBC 0 /100 WBCs      Neutrophils Relative 89 %      Immat GRANS % 0 %      Lymphocytes Relative 9 %      Monocytes Relative 2 %      Eosinophils Relative 0 %      Basophils Relative 0 %      Neutrophils Absolute 9 93 Thousands/µL      Immature Grans Absolute 0 04 Thousand/uL      Lymphocytes Absolute 1 03 Thousands/µL      Monocytes Absolute 0 21 Thousand/µL      Eosinophils Absolute 0 00 Thousand/µL      Basophils Absolute 0 03 Thousands/µL     Fingerstick Glucose (POCT) [[id number]]  (Abnormal) Collected: 08/15/22 1339    Lab Status: Final result Updated: 08/15/22 1341     POC Glucose >500 mg/dl                  XR chest portable   Final Result by Stewart Christine MD (08/15 2207)      Evidence of small left pleural effusion  Otherwise no acute intrathoracic process  Workstation performed: [id number]         CT head without contrast   Final Result by Kwame Chiang DO (08/15 1430)      No acute intracranial abnormality  Workstation performed: [id number]                    Procedures  Procedures         ED Course                                             MDM      Disposition  Final diagnoses:   DKA (diabetic ketoacidosis) ([facility] [address] )     Time reflects when diagnosis was documented in both MDM as applicable and the Disposition within this note     Time User Action Codes Description Comment    8/15/2022  3:42 PM Tesoriero, Colletta Able Add [E11 65,  Z79 4] Type 2 diabetes mellitus with hyperglycemia, with long-term current use of insulin ([facility] [address] )     8/17/2022  4:01 PM Noemi Conway Add [E11 10] DKA (diabetic ketoacidosis) [facility])       ED Disposition     ED Disposition   Discharge    Condition   Stable    Date/Time   Wed Aug 17, 2022  4:01 PM    Comment   Danielle Ruvalcaba discharge to home/self care                 Follow-up Information    None         Current Discharge Medication List      CONTINUE these medications which have NOT CHANGED    Details   amLODIPine (NORVASC) 10 mg tablet Take 10 mg by mouth daily      carvedilol (COREG CR) 10 MG 24 hr capsule Take 25 mg by mouth 2 (two) times a day      enalapril (VASOTEC) 20 mg tablet Take 20 mg by mouth daily      hydrochlorothiazide (MICROZIDE) 12 5 mg capsule Take 12 5 mg by mouth daily      metFORMIN (GLUCOPHAGE) 500 mg tablet Take 500 mg by mouth in the morning      simvastatin (ZOCOR) 20 mg tablet Take 20 mg by mouth daily at bedtime             No discharge procedures on file      PDMP Review     None          ED Provider  Electronically Signed by           Anne Ross DO  08/17/22 4174

## 2022-08-15 NOTE — H&P
[address] [facility] 1950, 66 y o  female MRN: [id number]  Unit/Bed#: ED 07 Encounter: [id number]  Primary Care Provider: No primary care provider on file  Date and time admitted to hospital: 8/15/2022  1:34 PM    * Hyperglycemic crisis due to diabetes mellitus [facility])  Assessment & Plan  Lab Results   Component Value Date    HGBA1C 9 1 (H) 07/30/2022       Recent Labs     08/15/22  1339 08/15/22  1635   POCGLU >500* >500*       Blood Sugar Average: Last 72 hrs:    Treat per DKA protocol  There is evidence of ketosis and only mild acidosis  Degree of hyperglycemia favors hyperglycemic emergency  Regardless, treated the same with aggressive hydration and IV insulin  Follow electrolytes per protocol  No angina, EKG with T wave changes in inferior, and negative HS troponin  UA without evidence of infection and will check CXR  Previously on metformin alone  Patient expresses hesitancy related to insulin injections  Consult Endocrinology  Acute kidney injury superimposed on chronic kidney disease [facility])  Assessment & Plan  Lab Results   Component Value Date    EGFR 20 08/15/2022    CREATININE 2 30 (H) 08/15/2022   Baseline Cr 1 21 (July 2022)     Likely due to diabetic emergency, continue hydration monitor urine output, acid-base status, and check renal US if does not improve with hydration  Hold ACE, diuretic  Aortic insufficiency  Assessment & Plan  Mod AI noted in 2014 with no follow up since that time  Continue to monitor      Type 2 diabetes mellitus with hyperglycemia, with long-term current use of insulin [facility])  Assessment & Plan  Lab Results   Component Value Date    HGBA1C 9 1 (H) 07/30/2022       Recent Labs     08/15/22  1339 08/15/22  1635   POCGLU >500* >500*       Blood Sugar Average: Last 72 hrs:     See hyperglycemic emergency plan  At family request will consult endocrine given desire for new provider       Hypertension associated with diabetes [facility])  Assessment & Plan  Lab Results   Component Value Date    HGBA1C 9 1 (H) 07/30/2022       Recent Labs     08/15/22  1339 08/15/22  1635   POCGLU >500* >500*       Blood Sugar Average: Last 72 hrs:     Home BP Rx on hold except for beta blocker given LADARIUS  Monitor BP trend and give IV prn if needed for BP > 180      -------------------------------------------------------------------------------------------------------------  Chief Complaint: I feel weak    History of Present Illness   HX and PE limited by: none  Lesly Farrar is a 66 y o  female who presents with increased weakness and confusion over last several days  Helped daughter move recently  Has been taking metformin for DM only  Denies CP, SOB, ROUSSEAU, urinary frequency, urgency, dysuria  History obtained from chart review and the patient   -------------------------------------------------------------------------------------------------------------  Dispo: Admit to Stepdown Level 2    Code Status: Level 1 - Full Code  --------------------------------------------------------------------------------------------------------------  Review of Systems    A 12-point, complete review of systems was reviewed and negative except as stated above     Physical Exam  Vitals and nursing note reviewed  Constitutional:       General: She is not in acute distress  Appearance: She is well-developed  She is ill-appearing  HENT:      Head: Normocephalic and atraumatic  Comments: Mucous membranes & tongue dry  Eyes:      Conjunctiva/sclera: Conjunctivae normal    Neck:      Vascular: No JVD  Cardiovascular:      Rate and Rhythm: Normal rate and regular rhythm  Heart sounds: No murmur heard  Pulmonary:      Effort: Pulmonary effort is normal  No respiratory distress  Breath sounds: Normal breath sounds  Abdominal:      General: Abdomen is flat  Palpations: Abdomen is soft  Tenderness: There is no abdominal tenderness  Musculoskeletal:      Cervical back: Neck supple  Right lower leg: No edema  Left lower leg: No edema  Skin:     General: Skin is warm and dry  Capillary Refill: Capillary refill takes less than 2 seconds  Neurological:      General: No focal deficit present  Mental Status: She is alert and oriented to person, place, and time  Mental status is at baseline  GCS: GCS eye subscore is 4  GCS verbal subscore is 5  GCS motor subscore is 6  Comments: Answers questions more slowly but responses are of appropriate articulation        --------------------------------------------------------------------------------------------------------------  Vitals:   Vitals:    08/15/22 1545 08/15/22 1600 08/15/22 1648 08/15/22 1654   BP: (!) 183/79 137/63  135/62   BP Location:       Pulse: 61 73  73   Resp: 19 20     Temp:       SpO2: 99% 100%     Weight:       Height:   5' 4" (1 626 m)      Temp  Min: 97 9 °F (36 6 °C)  Max: 97 9 °F (36 6 °C)     Height: 5' 4" (162 6 cm)  Body mass index is 26 15 kg/m²      Laboratory and Diagnostics:  Results from last 7 days   Lab Units 08/15/22  1426 08/15/22  1405   WBC Thousand/uL  --  11 24*   HEMOGLOBIN g/dL  --  14 0   I STAT HEMOGLOBIN g/dl 12 6  --    HEMATOCRIT %  --  44 8   HEMATOCRIT, ISTAT % 37  --    PLATELETS Thousands/uL  --  179   NEUTROS PCT %  --  89*   MONOS PCT %  --  2*     Results from last 7 days   Lab Units 08/15/22  1426 08/15/22  1405   SODIUM mmol/L  --  137   POTASSIUM mmol/L  --  4 7   CHLORIDE mmol/L  --  96   CO2 mmol/L  --  19*   CO2, I-STAT mmol/L 19*  --    ANION GAP mmol/L  --  22*   BUN mg/dL  --  50*   CREATININE mg/dL  --  2 30*   CALCIUM mg/dL  --  9 7   GLUCOSE RANDOM mg/dL  --  833*   ALT U/L  --  19   AST U/L  --  10   ALK PHOS U/L  --  74   ALBUMIN g/dL  --  4 0   TOTAL BILIRUBIN mg/dL  --  0 49     Results from last 7 days   Lab Units 08/15/22  1614   MAGNESIUM mg/dL 2 9*   PHOSPHORUS mg/dL 4 9*      Results from last 7 days   Lab Units 08/15/22  1405   INR  1 27*        EKG: SR T wave changes inferior, prolonged QTc  Imaging: I have personally reviewed pertinent reports  and I have personally reviewed pertinent films in PACS no infiltrates       Historical Information   Past Medical History:   Diagnosis Date    Diabetes mellitus ([address] )     Hypertension      History reviewed  No pertinent surgical history  Social History   Social History     Substance and Sexual Activity   Alcohol Use Never     Social History     Substance and Sexual Activity   Drug Use Never     Social History     Tobacco Use   Smoking Status Never Smoker   Smokeless Tobacco Never Used     Exercise History: > 4 mets  Family History:   History reviewed  No pertinent family history  I have reviewed this patient's family history and commented on sigificant items within the HPI      Medications:  Current Facility-Administered Medications   Medication Dose Route Frequency    carvedilol (COREG) tablet 25 mg  25 mg Oral BID With Meals    dextrose 5 % and sodium chloride 0 45 % infusion  250 mL/hr Intravenous Continuous    heparin (porcine) subcutaneous injection 5,000 Units  5,000 Units Subcutaneous Q8H [address]    insulin regular (HumuLIN R,NovoLIN R) 1 Units/mL in sodium chloride 0 9 % 100 mL infusion  0 1-30 Units/hr Intravenous Continuous    lactated ringers infusion  500 mL/hr Intravenous Continuous    Followed by   Nic Searcy lactated ringers infusion  250 mL/hr Intravenous Continuous    potassium-sodium phosphateS (K-PHOS,PHOSPHA 250) -250 mg tablet 1 tablet  1 tablet Oral BID With Meals    pravastatin (PRAVACHOL) tablet 40 mg  40 mg Oral Daily With Dinner    sodium chloride (PF) 0 9 % injection 3 mL  3 mL Intravenous Q1H PRN     Home medications:  Prior to Admission Medications   Prescriptions Last Dose Informant Patient Reported? Taking?    amLODIPine (NORVASC) 10 mg tablet   Yes Yes   Sig: Take 10 mg by mouth daily   carvedilol (COREG CR) 10 MG 24 hr capsule   Yes Yes   Sig: Take 25 mg by mouth 2 (two) times a day   enalapril (VASOTEC) 20 mg tablet   Yes Yes   Sig: Take 20 mg by mouth daily   hydrochlorothiazide (MICROZIDE) 12 5 mg capsule   Yes Yes   Sig: Take 12 5 mg by mouth daily   metFORMIN (GLUCOPHAGE) 500 mg tablet   Yes Yes   Sig: Take 500 mg by mouth in the morning   simvastatin (ZOCOR) 20 mg tablet   Yes Yes   Sig: Take 20 mg by mouth daily at bedtime      Facility-Administered Medications: None     Allergies:  No Known Allergies    ------------------------------------------------------------------------------------------------------------  Advance Directive and Living Will:      Power of :    POLST:    ------------------------------------------------------------------------------------------------------------  Anticipated Length of Stay is > 2 midnights    Care Time Delivered:   Upon my evaluation, this patient had a high probability of imminent or life-threatening deterioration due to Hyperglycemic emergency, which required my direct attention, intervention, and personal management  I have personally provided 30 minutes ([phone] [phone]) of critical care time, exclusive of procedures, teaching, family meetings, and any prior time recorded by providers other than myself  Uyen Crews, DO        Portions of the record may have been created with voice recognition software  Occasional wrong word or "sound a like" substitutions may have occurred due to the inherent limitations of voice recognition software    Read the chart carefully and recognize, using context, where substitutions have occurred

## 2022-08-15 NOTE — ASSESSMENT & PLAN NOTE
Lab Results   Component Value Date    HGBA1C 9 1 (H) 07/30/2022       Recent Labs     08/15/22  1339 08/15/22  1635   POCGLU >500* >500*       Blood Sugar Average: Last 72 hrs:     See hyperglycemic emergency plan  At family request will consult endocrine given desire for new provider

## 2022-08-15 NOTE — ASSESSMENT & PLAN NOTE
Lab Results   Component Value Date    HGBA1C 9 1 (H) 07/30/2022       Recent Labs     08/15/22  1339 08/15/22  1635   POCGLU >500* >500*       Blood Sugar Average: Last 72 hrs:     Home BP Rx on hold except for beta blocker given LADARIUS  Monitor BP trend and give IV prn if needed for BP > 180

## 2022-08-15 NOTE — ASSESSMENT & PLAN NOTE
Lab Results   Component Value Date    HGBA1C 9 1 (H) 07/30/2022       Recent Labs     08/15/22  1339 08/15/22  1635   POCGLU >500* >500*       Blood Sugar Average: Last 72 hrs:    Treat per DKA protocol  There is evidence of ketosis and only mild acidosis  Degree of hyperglycemia favors hyperglycemic emergency  Regardless, treated the same with aggressive hydration and IV insulin  Follow electrolytes per protocol  No angina, EKG with T wave changes in inferior, and negative HS troponin  UA without evidence of infection and will check CXR  Previously on metformin alone  Patient expresses hesitancy related to insulin injections  Consult Endocrinology

## 2022-08-16 LAB
ANION GAP SERPL CALCULATED.3IONS-SCNC: 6 MMOL/L (ref 4–13)
ANION GAP SERPL CALCULATED.3IONS-SCNC: 8 MMOL/L (ref 4–13)
BUN SERPL-MCNC: 32 MG/DL (ref 5–25)
BUN SERPL-MCNC: 37 MG/DL (ref 5–25)
CA-I BLD-SCNC: 1.11 MMOL/L (ref 1.12–1.32)
CALCIUM SERPL-MCNC: 8.3 MG/DL (ref 8.3–10.1)
CALCIUM SERPL-MCNC: 8.5 MG/DL (ref 8.3–10.1)
CHLORIDE SERPL-SCNC: 109 MMOL/L (ref 96–108)
CHLORIDE SERPL-SCNC: 110 MMOL/L (ref 96–108)
CO2 SERPL-SCNC: 26 MMOL/L (ref 21–32)
CO2 SERPL-SCNC: 27 MMOL/L (ref 21–32)
CREAT SERPL-MCNC: 1.28 MG/DL (ref 0.6–1.3)
CREAT SERPL-MCNC: 1.56 MG/DL (ref 0.6–1.3)
ERYTHROCYTE [DISTWIDTH] IN BLOOD BY AUTOMATED COUNT: 13.2 % (ref 11.6–15.1)
GFR SERPL CREATININE-BSD FRML MDRD: 32 ML/MIN/1.73SQ M
GFR SERPL CREATININE-BSD FRML MDRD: 41 ML/MIN/1.73SQ M
GLUCOSE SERPL-MCNC: 115 MG/DL (ref 65–140)
GLUCOSE SERPL-MCNC: 127 MG/DL (ref 65–140)
GLUCOSE SERPL-MCNC: 130 MG/DL (ref 65–140)
GLUCOSE SERPL-MCNC: 161 MG/DL (ref 65–140)
GLUCOSE SERPL-MCNC: 162 MG/DL (ref 65–140)
GLUCOSE SERPL-MCNC: 175 MG/DL (ref 65–140)
GLUCOSE SERPL-MCNC: 178 MG/DL (ref 65–140)
GLUCOSE SERPL-MCNC: 194 MG/DL (ref 65–140)
GLUCOSE SERPL-MCNC: 199 MG/DL (ref 65–140)
GLUCOSE SERPL-MCNC: 209 MG/DL (ref 65–140)
GLUCOSE SERPL-MCNC: 237 MG/DL (ref 65–140)
GLUCOSE SERPL-MCNC: 248 MG/DL (ref 65–140)
GLUCOSE SERPL-MCNC: 271 MG/DL (ref 65–140)
GLUCOSE SERPL-MCNC: 389 MG/DL (ref 65–140)
GLUCOSE SERPL-MCNC: 397 MG/DL (ref 65–140)
GLUCOSE SERPL-MCNC: 56 MG/DL (ref 65–140)
GLUCOSE SERPL-MCNC: 61 MG/DL (ref 65–140)
HCT VFR BLD AUTO: 36 % (ref 34.8–46.1)
HGB BLD-MCNC: 11.7 G/DL (ref 11.5–15.4)
MAGNESIUM SERPL-MCNC: 2.4 MG/DL (ref 1.6–2.6)
MAGNESIUM SERPL-MCNC: 2.7 MG/DL (ref 1.6–2.6)
MCH RBC QN AUTO: 28.3 PG (ref 26.8–34.3)
MCHC RBC AUTO-ENTMCNC: 32.5 G/DL (ref 31.4–37.4)
MCV RBC AUTO: 87 FL (ref 82–98)
PHOSPHATE SERPL-MCNC: 2.7 MG/DL (ref 2.3–4.1)
PHOSPHATE SERPL-MCNC: 2.8 MG/DL (ref 2.3–4.1)
PLATELET # BLD AUTO: 167 THOUSANDS/UL (ref 149–390)
PMV BLD AUTO: 13.5 FL (ref 8.9–12.7)
POTASSIUM SERPL-SCNC: 3.3 MMOL/L (ref 3.5–5.3)
POTASSIUM SERPL-SCNC: 4.4 MMOL/L (ref 3.5–5.3)
PROCALCITONIN SERPL-MCNC: 0.62 NG/ML
RBC # BLD AUTO: 4.13 MILLION/UL (ref 3.81–5.12)
SODIUM SERPL-SCNC: 143 MMOL/L (ref 135–147)
SODIUM SERPL-SCNC: 143 MMOL/L (ref 135–147)
WBC # BLD AUTO: 11.77 THOUSAND/UL (ref 4.31–10.16)

## 2022-08-16 PROCEDURE — 82948 REAGENT STRIP/BLOOD GLUCOSE: CPT

## 2022-08-16 PROCEDURE — 85027 COMPLETE CBC AUTOMATED: CPT

## 2022-08-16 PROCEDURE — 80048 BASIC METABOLIC PNL TOTAL CA: CPT

## 2022-08-16 PROCEDURE — 82330 ASSAY OF CALCIUM: CPT

## 2022-08-16 PROCEDURE — 80048 BASIC METABOLIC PNL TOTAL CA: CPT | Performed by: ANESTHESIOLOGY

## 2022-08-16 PROCEDURE — 83735 ASSAY OF MAGNESIUM: CPT | Performed by: ANESTHESIOLOGY

## 2022-08-16 PROCEDURE — 84100 ASSAY OF PHOSPHORUS: CPT

## 2022-08-16 PROCEDURE — 84100 ASSAY OF PHOSPHORUS: CPT | Performed by: ANESTHESIOLOGY

## 2022-08-16 PROCEDURE — 99222 1ST HOSP IP/OBS MODERATE 55: CPT | Performed by: INTERNAL MEDICINE

## 2022-08-16 PROCEDURE — 83735 ASSAY OF MAGNESIUM: CPT

## 2022-08-16 PROCEDURE — 84145 PROCALCITONIN (PCT): CPT

## 2022-08-16 PROCEDURE — 99232 SBSQ HOSP IP/OBS MODERATE 35: CPT | Performed by: ANESTHESIOLOGY

## 2022-08-16 RX ORDER — SODIUM CHLORIDE 450 MG/100ML
150 INJECTION, SOLUTION INTRAVENOUS CONTINUOUS
Status: DISCONTINUED | OUTPATIENT
Start: 2022-08-16 | End: 2022-08-16

## 2022-08-16 RX ORDER — INSULIN LISPRO 100 [IU]/ML
1-5 INJECTION, SOLUTION INTRAVENOUS; SUBCUTANEOUS
Status: DISCONTINUED | OUTPATIENT
Start: 2022-08-17 | End: 2022-08-18 | Stop reason: HOSPADM

## 2022-08-16 RX ORDER — CALCIUM GLUCONATE 20 MG/ML
2 INJECTION, SOLUTION INTRAVENOUS ONCE
Status: COMPLETED | OUTPATIENT
Start: 2022-08-16 | End: 2022-08-16

## 2022-08-16 RX ORDER — POTASSIUM CHLORIDE 14.9 MG/ML
20 INJECTION INTRAVENOUS
Status: COMPLETED | OUTPATIENT
Start: 2022-08-16 | End: 2022-08-16

## 2022-08-16 RX ORDER — GLIMEPIRIDE 2 MG/1
4 TABLET ORAL 2 TIMES DAILY WITH MEALS
Status: DISCONTINUED | OUTPATIENT
Start: 2022-08-16 | End: 2022-08-18

## 2022-08-16 RX ORDER — INSULIN GLARGINE 100 [IU]/ML
15 INJECTION, SOLUTION SUBCUTANEOUS
Status: DISCONTINUED | OUTPATIENT
Start: 2022-08-16 | End: 2022-08-17

## 2022-08-16 RX ADMIN — AMLODIPINE BESYLATE 10 MG: 10 TABLET ORAL at 08:36

## 2022-08-16 RX ADMIN — SODIUM CHLORIDE 3 UNITS/HR: 9 INJECTION, SOLUTION INTRAVENOUS at 00:53

## 2022-08-16 RX ADMIN — SODIUM CHLORIDE 150 ML/HR: 0.45 INJECTION, SOLUTION INTRAVENOUS at 08:35

## 2022-08-16 RX ADMIN — CARVEDILOL 25 MG: 12.5 TABLET, FILM COATED ORAL at 15:58

## 2022-08-16 RX ADMIN — GLIMEPIRIDE 4 MG: 2 TABLET ORAL at 16:07

## 2022-08-16 RX ADMIN — CALCIUM GLUCONATE 2 G: 20 INJECTION, SOLUTION INTRAVENOUS at 07:55

## 2022-08-16 RX ADMIN — POTASSIUM CHLORIDE 20 MEQ: 14.9 INJECTION, SOLUTION INTRAVENOUS at 01:05

## 2022-08-16 RX ADMIN — DEXTROSE AND SODIUM CHLORIDE 150 ML/HR: 5; .45 INJECTION, SOLUTION INTRAVENOUS at 02:57

## 2022-08-16 RX ADMIN — POTASSIUM CHLORIDE 20 MEQ: 14.9 INJECTION, SOLUTION INTRAVENOUS at 02:58

## 2022-08-16 RX ADMIN — HEPARIN SODIUM 5000 UNITS: 5000 INJECTION INTRAVENOUS; SUBCUTANEOUS at 19:00

## 2022-08-16 RX ADMIN — PRAVASTATIN SODIUM 40 MG: 40 TABLET ORAL at 15:58

## 2022-08-16 RX ADMIN — SODIUM CHLORIDE 6 UNITS/HR: 9 INJECTION, SOLUTION INTRAVENOUS at 07:50

## 2022-08-16 RX ADMIN — CARVEDILOL 25 MG: 12.5 TABLET, FILM COATED ORAL at 07:55

## 2022-08-16 RX ADMIN — HEPARIN SODIUM 5000 UNITS: 5000 INJECTION INTRAVENOUS; SUBCUTANEOUS at 04:32

## 2022-08-16 RX ADMIN — DIBASIC SODIUM PHOSPHATE, MONOBASIC POTASSIUM PHOSPHATE AND MONOBASIC SODIUM PHOSPHATE 1 TABLET: 852; 155; 130 TABLET ORAL at 15:58

## 2022-08-16 RX ADMIN — INSULIN GLARGINE 15 UNITS: 100 INJECTION, SOLUTION SUBCUTANEOUS at 21:58

## 2022-08-16 RX ADMIN — DIBASIC SODIUM PHOSPHATE, MONOBASIC POTASSIUM PHOSPHATE AND MONOBASIC SODIUM PHOSPHATE 1 TABLET: 852; 155; 130 TABLET ORAL at 07:55

## 2022-08-16 RX ADMIN — HEPARIN SODIUM 5000 UNITS: 5000 INJECTION INTRAVENOUS; SUBCUTANEOUS at 12:00

## 2022-08-16 NOTE — ASSESSMENT & PLAN NOTE
Lab Results   Component Value Date    EGFR 22 08/15/2022    EGFR 20 08/15/2022    CREATININE 2 15 (H) 08/15/2022    CREATININE 2 30 (H) 08/15/2022   Baseline Cr 1 21 (July 2022)     · Likely due to diabetic emergency  · Continue hydration monitor urine output, Acid-base status, and check renal US if does not improve with hydration  · Hold ACE, diuretic    · Creatinine is trending down  · Trend renal indices

## 2022-08-16 NOTE — PROGRESS NOTES
[address] [facility]  Progress Note - Nani Burgos 1950, 66 y o  female MRN: [id number]  Unit/Bed#:  Encounter: [id number]  Primary Care Provider: No primary care provider on file  Date and time admitted to hospital: 8/15/2022  1:34 PM    * Hyperglycemic crisis due to diabetes mellitus [facility])  Assessment & Plan  Lab Results   Component Value Date    HGBA1C 12 0 (H) 08/15/2022       Recent Labs     08/15/22  1947 08/15/22  2044 08/15/22  2144 08/15/22  2250   POCGLU 326* 279* 219* 207*       Blood Sugar Average: Last 72 hrs:  (P) 280 6  Treat per DKA protocol  There is evidence of ketosis and only mild acidosis  Degree of hyperglycemia favors hyperglycemic emergency  Regardless, treated the same with aggressive hydration and IV insulin  Follow electrolytes per protocol  No angina, EKG with T wave changes in inferior, and negative HS troponin  UA and CXR without evidence of infection  Previously on metformin alone  Patient expresses hesitancy related to insulin injections  Consult Endocrinology  Acute kidney injury superimposed on chronic kidney disease [facility])  Assessment & Plan  Lab Results   Component Value Date    EGFR 22 08/15/2022    EGFR 20 08/15/2022    CREATININE 2 15 (H) 08/15/2022    CREATININE 2 30 (H) 08/15/2022   Baseline Cr 1 21 (July 2022)     · Likely due to diabetic emergency  · Continue hydration monitor urine output, Acid-base status, and check renal US if does not improve with hydration  · Hold ACE, diuretic  · Creatinine is trending down  · Trend renal indices     Aortic insufficiency  Assessment & Plan  · Mod AI noted in 2014 with no follow up since that time     · Continue to monitor    Type 2 diabetes mellitus with hyperglycemia, with long-term current use of insulin [facility])  Assessment & Plan  Lab Results   Component Value Date    HGBA1C 12 0 (H) 08/15/2022       Recent Labs     08/15/22  1947 08/15/22  2044 08/15/22  2144 08/15/22  2250   POCGLU 326* 279* 219* 207*       Blood Sugar Average: Last 72 hrs:  (P) 280 6   See hyperglycemic emergency plan  At family request will consult endocrine given desire for new provider  Hypertension associated with diabetes [facility])  Assessment & Plan  Lab Results   Component Value Date    HGBA1C 12 0 (H) 08/15/2022       Recent Labs     08/15/22  1947 08/15/22  2044 08/15/22  2144 08/15/22  2250   POCGLU 326* 279* 219* 207*       Blood Sugar Average: Last 72 hrs:  (P) 280 6   ACE-I on hold given LADARIUS  Continue beta blocker and resume norvasc in the morning as patient's BP trending up  Hydralazine PRN SBP >180  Monitor BP trend and give IV prn if needed for BP > 180      ----------------------------------------------------------------------------------------  HPI/24hr events: Patient admitted to stepdown unit with hyperglycemia likely secondary to diabetic emergency  Hyperglycemia has now resolved and anion gap has closed  Insulin gtt transitioned to non-DKA  Plan to continue insulin gtt until endocrine provides recommendations  Patient is appropriate for transfer out of the stepdown unit today  Patient appropriate for transfer out of the ICU today?: Patient does not meet criteria for referral to the ICU Follow-Up Clinic; referral has not been made  Disposition: Transfer to Med-Surg   Code Status: Level 1 - Full Code  ---------------------------------------------------------------------------------------  SUBJECTIVE  "I feel much better "    Review of Systems   Constitutional: Positive for fatigue  Neurological: Negative for dizziness and light-headedness  All other systems reviewed and are negative      Review of systems was reviewed and negative unless stated above in HPI/24-hour events   ---------------------------------------------------------------------------------------  OBJECTIVE    Vitals   Vitals:    08/15/22 1738 08/15/22 1800 08/15/22 1849 08/15/22 2100   BP: 163/87 127/58 162/79 149/66   BP Location: Left arm Left arm Left arm Left arm   Pulse: 68 66 75 60   Resp: 15 18 16 20   Temp: 97 5 °F (36 4 °C)  98 2 °F (36 8 °C) 98 1 °F (36 7 °C)   TempSrc: Oral  Oral Oral   SpO2: 95% 94% 94% 95%   Weight:       Height:         Temp (24hrs), Av 9 °F (36 6 °C), Min:97 5 °F (36 4 °C), Max:98 2 °F (36 8 °C)  Current: Temperature: 98 1 °F (36 7 °C)          Respiratory:  SpO2: SpO2: 95 %       Invasive/non-invasive ventilation settings   Respiratory  Report   Lab Data (Last 4 hours)    None         O2/Vent Data (Last 4 hours)    None                Physical Exam  Vitals reviewed  Constitutional:       General: She is not in acute distress  Appearance: Normal appearance  HENT:      Head: Normocephalic and atraumatic  Mouth/Throat:      Mouth: Mucous membranes are moist    Cardiovascular:      Rate and Rhythm: Normal rate and regular rhythm  Pulses: Normal pulses  Heart sounds: Normal heart sounds  Pulmonary:      Effort: Pulmonary effort is normal  No respiratory distress  Breath sounds: Normal breath sounds  Abdominal:      General: Abdomen is flat  There is no distension  Palpations: Abdomen is soft  Tenderness: There is no abdominal tenderness  There is no guarding  Musculoskeletal:         General: Normal range of motion  Right lower leg: No edema  Left lower leg: No edema  Skin:     General: Skin is warm and dry  Capillary Refill: Capillary refill takes less than 2 seconds  Neurological:      General: No focal deficit present  Mental Status: She is alert and oriented to person, place, and time  Mental status is at baseline               Laboratory and Diagnostics:  Results from last 7 days   Lab Units 08/15/22  1426 08/15/22  1405   WBC Thousand/uL  --  11 24*   HEMOGLOBIN g/dL  --  14 0   I STAT HEMOGLOBIN g/dl 12 6  --    HEMATOCRIT %  --  44 8   HEMATOCRIT, ISTAT % 37  --    PLATELETS Thousands/uL  --  179   NEUTROS PCT %  --  89*   MONOS PCT %  --  2* Results from last 7 days   Lab Units 08/15/22  1657 08/15/22  1426 08/15/22  1405   SODIUM mmol/L 140  --  137   POTASSIUM mmol/L 3 9  --  4 7   CHLORIDE mmol/L 100  --  96   CO2 mmol/L 20*  --  19*   CO2, I-STAT mmol/L  --  19*  --    ANION GAP mmol/L 20*  --  22*   BUN mg/dL 47*  --  50*   CREATININE mg/dL 2 15*  --  2 30*   CALCIUM mg/dL 8 7  --  9 7   GLUCOSE RANDOM mg/dL 517*  --  833*   ALT U/L 22  --  19   AST U/L 19  --  10   ALK PHOS U/L 72  --  74   ALBUMIN g/dL 3 8  --  4 0   TOTAL BILIRUBIN mg/dL 0 29  --  0 49     Results from last 7 days   Lab Units 08/15/22  1614   MAGNESIUM mg/dL 2 9*   PHOSPHORUS mg/dL 4 9*      Results from last 7 days   Lab Units 08/15/22  1405   INR  1 27*              ABG:    VBG:    Results from last 7 days   Lab Units 08/15/22  1657   PROCALCITONIN ng/ml 0 44*       Micro        EKG: Sinus rhythm on EKG  Imaging: I have personally reviewed pertinent reports  Intake and Output  I/O       08/14 0701  08/15 0700 08/15 0701 08/16 0700    P  O   0    I V  (mL/kg)  1788 (27 6)    Total Intake(mL/kg)  1788 (27 6)    Urine (mL/kg/hr)  0    Total Output  0    Net  +1788                Height and Weights   Height: 5' 2" (157 5 cm)     Body mass index is 26 13 kg/m²  Weight (last 2 days)     Date/Time Weight    08/15/22 1700 64 8 (142 86)    08/15/22 1344 69 1 (152 34)            Nutrition       Diet Orders   (From admission, onward)             Start     Ordered    08/15/22 1635  Diet NPO  Diet effective now        References:    Nutrtion Support Algorithm Enteral vs  Parenteral   Question Answer Comment   Diet Type NPO    RD to adjust diet per protocol?  No        08/15/22 1634                  Active Medications  Scheduled Meds:  Current Facility-Administered Medications   Medication Dose Route Frequency Provider Last Rate    amLODIPine  10 mg Oral Daily MARK Rivas      carvedilol  25 mg Oral BID With Meals Justin Tesoriero, DO      dextrose 5 % and sodium chloride 0 45 %  250 mL/hr Intravenous Continuous Justin Tesoriero,  mL/hr (08/15/22 2155)    heparin (porcine)  5,000 Units Subcutaneous [facility] Justin Tesoriero, DO      hydrALAZINE  5 mg Intravenous Q6H PRN MARK Ibarra      insulin regular (HumuLIN R,NovoLIN R) infusion  0 1-30 Units/hr Intravenous Continuous Justin Tesoriero, DO 6 92 Units/hr (08/15/22 2353)    lactated ringers  250 mL/hr Intravenous Continuous Uyen Tichnor, DO Stopped (08/15/22 2302)    potassium-sodium phosphateS  1 tablet Oral BID With Meals Uyen Tichnor, DO      pravastatin  40 mg Oral Daily With [employer], DO      sodium chloride (PF)  3 mL Intravenous Q1H PRN Justin Tesoriero, DO       Continuous Infusions:  dextrose 5 % and sodium chloride 0 45 %, 250 mL/hr, Last Rate: 250 mL/hr (08/15/22 2155)  insulin regular (HumuLIN R,NovoLIN R) infusion, 0 1-30 Units/hr, Last Rate: 6 92 Units/hr (08/15/22 2353)  lactated ringers, 250 mL/hr, Last Rate: Stopped (08/15/22 2302)      PRN Meds:   hydrALAZINE, 5 mg, Q6H PRN  sodium chloride (PF), 3 mL, Q1H PRN        Invasive Devices Review  Invasive Devices  Report    Peripheral Intravenous Line  Duration           Peripheral IV 08/15/22 Left Antecubital <1 day    Peripheral IV 08/15/22 Right Antecubital <1 day                Rationale for remaining devices: N/A  ---------------------------------------------------------------------------------------  Advance Directive and Living Will:      Power of :    POLST:    ---------------------------------------------------------------------------------------  Care Time Delivered:   No Critical Care time spent       MARK Ibarra      Portions of the record may have been created with voice recognition software  Occasional wrong word or "sound a like" substitutions may have occurred due to the inherent limitations of voice recognition software    Read the chart carefully and recognize, using context, where substitutions have occurred

## 2022-08-16 NOTE — CONSULTS
Consultation - Dominique Brown 66 y o  female MRN: [id number]    Unit/Bed#:  Encounter: [id number]        REQUIRED DOCUMENTATION:     1  This service was provided via Telemedicine  2  Provider located at [address] [place] [address]  3  TeleMed provider: Radha Holley MD   4  Identify all parties in room with patient during tele consult:  Patient, Misty Bautista  5  Patient was then informed that this was a Telemedicine visit and that the exam was being conducted confidentially over secure lines  My office door was closed  No one else was in the room  Patient acknowledged consent and understanding of privacy and security of the Telemedicine visit, and gave us permission to have the assistant stay in the room in order to assist with the history and to conduct the exam   I informed the patient that I have reviewed their record in Epic and presented the opportunity for them to ask any questions regarding the visit today  The patient agreed to participate  Assessment/Plan     Assessment: This is a 66y o -year-old female with diabetes with hyperglycemia  1  DKA  Admitted with marked hyperglycemia with anion gap positivity and low pH along with elevated beta hydroxybutyrate consistent with DKA  Likely due to significantly high blood sugars with new onset type 2 diabetes uncontrolled  2  Type 2 diabetes  New onset  Will need dietary instruction and instruction in home blood glucose monitoring  Will need home blood glucose monitor when discharged  Blood sugars under control with IV insulin  Patient Ativan and very resistant about starting any insulin therapy  Will try oral agents with glimepiride 4 mg twice a day starting tonight  Will switch IV insulin to Lantus insulin 15 units at bedtime to night and sliding scale Humalog for tomorrow    If blood sugars not improving over the next several days, could add metformin back although she has difficulty swallowing metformin, there is an oral liquid form that can be obtained if needed  The other possibility would be to crush the pill and take it in applesauce  My hope is that oral agents will control her sugars to a reasonable amount of in the 200s and subcutaneous insulin therapy will not need to be  utilized as an outpatient  2  LADARIUS  She had LADARIUS with elevated creatinine on admission which has now come down and is almost normal     3  Altered mental status  This has improved with improvement in blood sugars  Plan:  1  Will start glimepiride 4 mg twice a day  2  Will discontinue IV insulin and start Lantus insulin 15 units daily tonight  Hopefully will be able to discontinue this before discharge  3  Will start Humalog insulin sliding scale tomorrow for higher blood sugars as needed  4  May need a restart metformin as renal function has improved  She does have difficulty swallowing so it may need to be crushed in applesauce or utilized as a liquid form  5  Dietary consult for diabetes diet Education  6  Instruction by nursing on home blood glucose monitoring  She will need home blood glucose monitor ordered for home what ever is approved by her insurance  7  Will need to follow-up with her primary care physician within several weeks after discharge  CC: Diabetes Consult    History of Present Illness     HPI: Aria Lee is a 66y o  year old female with type 2 diabetes of new onset admitted with hyperglycemic crisis and DKA  Endocrine is asked to consult for assistance in diabetes management  She reportedly had hemoglobin A1cs since 2019 ranging from 6 3-6 6 with the last hemoglobin A1c of 6 6% in January 2022  She recently saw her primary care physician who noted her hemoglobin A1c of 9 1% and started her on metformin 500 mg once daily in early August 2022  She had no GI side effects but does have difficulty swallowing large pills    She was then noted to have significant change in mental status with symptoms of hyperglycemia, weakness, and fatigue and was admitted to the hospital at [facility] [place] with marked hyperglycemia and diabetic ketoacidosis  She was placed on IV insulin therapy and sugars have improved along with her symptomatology  She is on oral agents at home  She was taking metformin 500 mg once daily since 08/01/2022  She admits to polyuria, polydipsia, nocturia, and polyphagia  She denies blurry vision  She had muscle weakness with fatigue and change in mental status with memory issues  There was no weight loss  There was no numbness or tingling of the feet  She denies neuropathy, nephropathy, retinopathy, heart attack, stroke and claudication but does admit to none  She denies any hypoglycemia  She was not checking her blood sugars at home as does not have a home blood glucose monitor  She did have a lower blood sugar today on her IV insulin and insulin was adjusted  Of note, there is a strong family history of type 2 diabetes in the maternal side of her family with multiple family members having type 2 diabetes including her mother  Inpatient consult to Endocrinology  Consult performed by: Wilbert Swan MD  Consult ordered by: Pratibha Michel PA-C          Review of Systems   Constitutional: Positive for fatigue  Negative for unexpected weight change  HENT: Negative for trouble swallowing  Eyes: Negative for visual disturbance  Respiratory: Negative for chest tightness and shortness of breath  Cardiovascular: Negative for chest pain  Gastrointestinal: Negative for abdominal pain, constipation, diarrhea and nausea  Endocrine: Positive for polydipsia, polyphagia and polyuria  Nocturia multiple times a night  Skin: Negative for wound  Neurological: Positive for weakness  Negative for dizziness, tremors, light-headedness, numbness and headaches  Felt generally weak  Psychiatric/Behavioral: Positive for confusion  Negative for sleep disturbance  Had memory issues and confusion on admission  Historical Information   Past Medical History:   Diagnosis Date    Diabetes mellitus ([address] )     Hypertension      History reviewed  No pertinent surgical history  Social History   Social History     Substance and Sexual Activity   Alcohol Use Never     Social History     Substance and Sexual Activity   Drug Use Never     Social History     Tobacco Use   Smoking Status Never Smoker   Smokeless Tobacco Never Used     Family History: History reviewed  No pertinent family history      Meds/Allergies   Current Facility-Administered Medications   Medication Dose Route Frequency Provider Last Rate Last Admin    amLODIPine (NORVASC) tablet 10 mg  10 mg Oral Daily MARK Ortiz   10 mg at 08/16/22 0836    carvedilol (COREG) tablet 25 mg  25 mg Oral BID With Meals Justin Tesoriero, DO   25 mg at 08/16/22 1558    glimepiride (AMARYL) tablet 4 mg  4 mg Oral BID With Meals Dulce Orozco MD        heparin (porcine) subcutaneous injection 5,000 Units  5,000 Units Subcutaneous On license of [facility] Justinyoly Hernandezoriero, DO   5,000 Units at 08/16/22 1200    hydrALAZINE (APRESOLINE) injection 5 mg  5 mg Intravenous Q6H PRN MARK Ortiz        insulin glargine (LANTUS) subcutaneous injection 15 Units 0 15 mL  15 Units Subcutaneous HS Dulce Orozco MD        [START ON 8/17/2022] insulin lispro (HumaLOG) 100 units/mL subcutaneous injection 1-5 Units  1-5 Units Subcutaneous TID AC Dulce Orozco MD        [START ON 8/17/2022] insulin lispro (HumaLOG) 100 units/mL subcutaneous injection 1-5 Units  1-5 Units Subcutaneous HS Dulce Orozco MD        insulin regular (HumuLIN R,NovoLIN R) 1 Units/mL in sodium chloride 0 9 % 100 mL infusion  0 3-21 Units/hr Intravenous Titrated Dulce Orozco MD 1 mL/hr at 08/16/22 1553 1 Units/hr at 08/16/22 1553    potassium-sodium phosphateS (K-PHOS,PHOSPHA 250) -250 mg tablet 1 tablet  1 tablet Oral BID With Meals Eula Jeana, DO   1 tablet at 08/16/22 1558    pravastatin (PRAVACHOL) tablet 40 mg  40 mg Oral Daily With [employer], DO   40 mg at 08/16/22 1558    sodium chloride (PF) 0 9 % injection 3 mL  3 mL Intravenous Q1H PRN Justin Cohn DO         No Known Allergies    Objective   Vitals: Blood pressure 167/70, pulse 61, temperature 98 1 °F (36 7 °C), temperature source Oral, resp  rate (!) 40, height 5' 2" (1 575 m), weight 64 8 kg (142 lb 13 7 oz), SpO2 94 %  Intake/Output Summary (Last 24 hours) at 8/16/2022 1558  Last data filed at 8/16/2022 1417  Gross per 24 hour   Intake 4598 58 ml   Output 1500 ml   Net 3098 58 ml     Invasive Devices  Report    Peripheral Intravenous Line  Duration           Peripheral IV 08/15/22 Left Antecubital 1 day    Peripheral IV 08/15/22 Right Antecubital 1 day                Physical Exam     Physical Exam   Constitutional:  She is oriented to person, place, and time  She appears well-developed and well-nourished  No distress  HENT:  No lid lag, stare, proptosis, or periorbital edema  Head: Normocephalic and atraumatic  Neck: Normal range of motion  No obvious neck enlargement  Pulmonary/Chest: Effort normal   No audible wheezing  Musculoskeletal: Normal range of motion  Neurological:  She is alert and oriented to person, place, and time  Skin:  She is not diaphoretic  Psychiatric:  She has a normal mood and affect  Her behavior is normal      The history was obtained from the review of the chart, patient  Lab Results:   Results from last 7 days   Lab Units 08/15/22  1614   HEMOGLOBIN A1C % 12 0*     Hemoglobin A1c on 07/30/2022 was 9 1% and on 01/27/2022 was 6 6%  Blood work on admission showed a glucose of 833 with a BUN of 50, creatinine 2 3, GFR 20, anion gap 22, CO2 19, beta hydroxybutyrate 6 3, and pH 7 298  Blood work today showed a creatinine of 1 28 with a BUN of 32 and a GFR 41  Creatinine as an outpatient was 1 09        Lab Results   Component Value Date    WBC 11 77 (H) 08/16/2022    HGB 11 7 08/16/2022    HCT 36 0 08/16/2022    MCV 87 08/16/2022     08/16/2022     Lab Results   Component Value Date/Time    BUN 32 (H) 08/16/2022 04:33 AM    K 4 4 08/16/2022 04:33 AM     (H) 08/16/2022 04:33 AM    CO2 27 08/16/2022 04:33 AM    CO2 19 (L) 08/15/2022 02:26 PM    CREATININE 1 28 08/16/2022 04:33 AM    AST 19 08/15/2022 04:57 PM    ALT 22 08/15/2022 04:57 PM    ALB 3 8 08/15/2022 04:57 PM     No results for input(s): CHOL, HDL, LDL, TRIG, VLDL in the last 72 hours  No results found for: Sameera So  POC Glucose (mg/dl)   Date Value   08/16/2022 61 (L)   08/16/2022 56 (L)   08/16/2022 194 (H)   08/16/2022 248 (H)   08/16/2022 178 (H)   08/16/2022 130   08/16/2022 115   08/16/2022 162 (H)   08/15/2022 213 (H)   08/15/2022 207 (H)       Imaging Studies: I have personally reviewed pertinent reports  Portions of the record may have been created with voice recognition software

## 2022-08-16 NOTE — PLAN OF CARE
Problem: Potential for Falls  Goal: Patient will remain free of falls  Description: INTERVENTIONS:  - Educate patient/family on patient safety including physical limitations  - Instruct patient to call for assistance with activity   - Consult OT/PT to assist with strengthening/mobility   - Keep Call bell within reach  - Keep bed low and locked with side rails adjusted as appropriate  - Keep care items and personal belongings within reach  - Initiate and maintain comfort rounds  - Make Fall Risk Sign visible to staff  - Offer Toileting every 2 Hours, in advance of need  - Initiate/Maintain bed alarm  - Apply yellow socks and bracelet for high fall risk patients  - Consider moving patient to room near nurses station  Outcome: Progressing     Problem: PAIN - ADULT  Goal: Verbalizes/displays adequate comfort level or baseline comfort level  Description: Interventions:  - Encourage patient to monitor pain and request assistance  - Assess pain using appropriate pain scale  - Administer analgesics based on type and severity of pain and evaluate response  - Implement non-pharmacological measures as appropriate and evaluate response  - Consider cultural and social influences on pain and pain management  - Notify physician/advanced practitioner if interventions unsuccessful or patient reports new pain  Outcome: Progressing     Problem: INFECTION - ADULT  Goal: Absence or prevention of progression during hospitalization  Description: INTERVENTIONS:  - Assess and monitor for signs and symptoms of infection  - Monitor lab/diagnostic results  - Monitor all insertion sites, i e  indwelling lines, tubes, and drains  - Monitor endotracheal if appropriate and nasal secretions for changes in amount and color  - [place] appropriate cooling/warming therapies per order  - Administer medications as ordered  - Instruct and encourage patient and family to use good hand hygiene technique  - Identify and instruct in appropriate isolation precautions for identified infection/condition  Outcome: Progressing    Problem: DISCHARGE PLANNING  Goal: Discharge to home or other facility with appropriate resources  Description: INTERVENTIONS:  - Identify barriers to discharge w/patient and caregiver  - Arrange for needed discharge resources and transportation as appropriate  - Identify discharge learning needs (meds, wound care, etc )  - Arrange for interpretive services to assist at discharge as needed  - Refer to Case Management Department for coordinating discharge planning if the patient needs post-hospital services based on physician/advanced practitioner order or complex needs related to functional status, cognitive ability, or social support system  Outcome: Progressing     Problem: Knowledge Deficit  Goal: Patient/family/caregiver demonstrates understanding of disease process, treatment plan, medications, and discharge instructions  Description: Complete learning assessment and assess knowledge base    Interventions:  - Provide teaching at level of understanding  - Provide teaching via preferred learning methods  Outcome: Progressing     Problem: METABOLIC, FLUID AND ELECTROLYTES - ADULT  Goal: Glucose maintained within target range  Description: INTERVENTIONS:  - Monitor Blood Glucose as ordered  - Assess for signs and symptoms of hyperglycemia and hypoglycemia  - Administer ordered medications to maintain glucose within target range  - Assess nutritional intake and initiate nutrition service referral as needed  Outcome: Progressing

## 2022-08-16 NOTE — UTILIZATION REVIEW
Initial Clinical Review    Admission: Date/Time/Statement:   Admission Orders (From admission, onward)     Ordered        08/15/22 1543  Inpatient Admission  Once                      Orders Placed This Encounter   Procedures    Inpatient Admission     Standing Status:   Standing     Number of Occurrences:   1     Order Specific Question:   Level of Care     Answer:   Level 2 Stepdown / HOT [14]     Order Specific Question:   Estimated length of stay     Answer:   More than 2 Midnights     Order Specific Question:   Certification     Answer:   I certify that inpatient services are medically necessary for this patient for a duration of greater than two midnights  See H&P and MD Progress Notes for additional information about the patient's course of treatment  ED Arrival Information     Expected   -    Arrival   8/15/2022 13:34    Acuity   Emergent            Means of arrival   Ambulance    Escorted by   [facility]    Service   Hospitalist    Admission type   Emergency            Arrival complaint   hypoglycemia               Chief Complaint   Patient presents with    Hyperglycemia - Symptomatic     Patient arrived by EMS for hyperglycemia  Patient was diagnosed as a diabetic last week and just started oral medications  Patient unsure of medication  Initial Presentation: 66 y o  female to the ED from home via EMS with complaints of hyperglycemia, weakness, fatigue  Admitted to CRITICAL CARE step down for hyperglycemic crisis due to diabetes mellitus, taiwo on CKD  H/O DMII, CKD, aortic insufficiency, htn  Arrives appearing ill with dry tongue and mucous membranes, slow to respond  ON arrival, betahydroxybutyrate > 6, creat 2 3 with most recent 1 21 in July 2022  Procal elevated, glucose > 800  Anion gap 22  IN the ED, given 2 5 liters ivfluids, started in insulin drip  Trend BMP  Date: 8/16   Day 2:   Improved glucose, iv fluids changed  Creat and anion gap improved  GCS 15    Continue with regular insulin infusion  Trial diet  Lungs clear  Abdomen soft  Denies n/v      Endocrine consult: New onset type 2 DM with DKA  COntinue with IV insulin  Very resistant to starting any insulin therapy  Has difficulty swallowing metformin  Creat almost normal now     ED Triage Vitals   Temperature Pulse Respirations Blood Pressure SpO2   08/15/22 1348 08/15/22 1343 08/15/22 1343 08/15/22 1343 08/15/22 1343   97 9 °F (36 6 °C) 76 18 (!) 190/90 97 %      Temp Source Heart Rate Source Patient Position - Orthostatic VS BP Location FiO2 (%)   08/15/22 1738 08/15/22 1541 08/15/22 1541 08/15/22 1541 --   Oral Monitor Lying Left arm       Pain Score       08/15/22 1738       No Pain          Wt Readings from Last 1 Encounters:   08/15/22 64 8 kg (142 lb 13 7 oz)     Additional Vital Signs:   Date/Time Temp Pulse Resp BP MAP (mmHg) SpO2 O2 Device Patient Position - Orthostatic VS   08/16/22 1100 98 1 °F (36 7 °C) 60 21 146/66 95 95 % -- Sitting   08/16/22 1000 -- 60 26 Abnormal  135/60 86 95 % -- --   08/16/22 0836 -- -- -- 195/84 Abnormal  -- -- -- --   08/16/22 0700 97 8 °F (36 6 °C) 67 20 175/74 Abnormal  107 97 % -- Sitting   08/16/22 0305 98 4 °F (36 9 °C) 65 20 124/60 87 94 % None (Room air) Lying   08/15/22 2100 98 1 °F (36 7 °C) 60 20 149/66 95 95 % None (Room air) Lying   08/15/22 1849 98 2 °F (36 8 °C) 75 16 162/79 113 94 % None (Room air) Lying   08/15/22 1800 -- 66 18 127/58 84 94 % None (Room air) Lying   08/15/22 1738 97 5 °F (36 4 °C) 68 15 163/87 101 95 % None (Room air) Lying   08/15/22 1654 -- 73 -- 135/62 -- -- -- --   08/15/22 1630 -- 73 21 151/74 105 100 % -- --   08/15/22 1600 -- 73 20 137/63 91 100 % -- --   08/15/22 1545 -- 61 19 183/79 Abnormal  114 99 % -- --   08/15/22 1541 -- 63 18 183/79 Abnormal  -- 100 % None (Room air) Lying   08/15/22 1515 -- 67 20 -- -- 98 % -- --   08/15/22 1438 -- 67 20 190/85 Abnormal  -- 98 % -- --   08/15/22 1400 -- 82 20 -- -- 98 % None (Room air) --   08/15/22 1348 97 9 °F (36 6 °C) -- -- -- -- -- -- --   08/15/22 1343 -- 76 18 190/90 Abnormal  -- 97 % -- --       Pertinent Labs/Diagnostic Test Results:   8/15 EKG measurements:  Component Ref Range & Units 8/15/22 1413    Ventricular Rate BPM 67    Atrial Rate BPM 67    IN Interval ms 156    QRSD Interval ms 86    QT Interval ms 460    QTC Interval ms 486    P [place] degrees 78    QRS Axis degrees 37    T Wave Axis degrees -74        XR chest portable   Final Result by Kush Avilez MD (08/15 2207)      Evidence of small left pleural effusion  Otherwise no acute intrathoracic process  Workstation performed: [id number]         CT head without contrast   Final Result by Ban Krause, DO (08/15 1430)      No acute intracranial abnormality                    Workstation performed: [id number]               Results from last 7 days   Lab Units 08/16/22  0433 08/15/22  1426 08/15/22  1405   WBC Thousand/uL 11 77*  --  11 24*   HEMOGLOBIN g/dL 11 7  --  14 0   I STAT HEMOGLOBIN g/dl  --  12 6  --    HEMATOCRIT % 36 0  --  44 8   HEMATOCRIT, ISTAT %  --  37  --    PLATELETS Thousands/uL 167  --  179   NEUTROS ABS Thousands/µL  --   --  9 93*         Results from last 7 days   Lab Units 08/16/22  0433 08/16/22  0005 08/15/22  1657 08/15/22  1614 08/15/22  1426 08/15/22  1405   SODIUM mmol/L 143 143 140  --   --  137   POTASSIUM mmol/L 4 4 3 3* 3 9  --   --  4 7   CHLORIDE mmol/L 110* 109* 100  --   --  96   CO2 mmol/L 27 26 20*  --   --  19*   CO2, I-STAT mmol/L  --   --   --   --  19*  --    ANION GAP mmol/L 6 8 20*  --   --  22*   BUN mg/dL 32* 37* 47*  --   --  50*   CREATININE mg/dL 1 28 1 56* 2 15*  --   --  2 30*   EGFR ml/min/1 73sq m 41 32 22  --   --  20   CALCIUM mg/dL 8 3 8 5 8 7  --   --  9 7   CALCIUM, IONIZED mmol/L 1 11*  --   --   --   --   --    CALCIUM, IONIZED, ISTAT mmol/L  --   --   --   --  1 04*  --    MAGNESIUM mg/dL 2 4 2 7*  --  2 9*  --   --    PHOSPHORUS mg/dL 2 7 2 8  --  4 9*  -- --      Results from last 7 days   Lab Units 08/15/22  1657 08/15/22  1405   AST U/L 19 10   ALT U/L 22 19   ALK PHOS U/L 72 74   TOTAL PROTEIN g/dL 8 0 8 5*   ALBUMIN g/dL 3 8 4 0   TOTAL BILIRUBIN mg/dL 0 29 0 49     Results from last 7 days   Lab Units 08/16/22  0745 08/16/22  0548 08/16/22  0351 08/16/22  0147 08/16/22  0049 08/15/22  2346 08/15/22  2250 08/15/22  2144 08/15/22  2044 08/15/22  1947 08/15/22  1847 08/15/22  1635   POC GLUCOSE mg/dl 248* 178* 130 115 162* 213* 207* 219* 279* 326* 372* >500*     Results from last 7 days   Lab Units 08/16/22  0433 08/16/22  0005 08/15/22  1657 08/15/22  1405   GLUCOSE RANDOM mg/dL 161* 209* 517* 833*         Results from last 7 days   Lab Units 08/15/22  1614   HEMOGLOBIN A1C % 12 0*   EAG mg/dl 298     BETA-HYDROXYBUTYRATE   Date Value Ref Range Status   08/15/2022 6 3 (H) <0 6 mmol/L Final        Results from last 7 days   Lab Units 08/15/22  1426   PH, JASKARAN I-STAT  7 298*   PCO2, JASKARAN ISTAT mm HG 36 0*   PO2, JASKARAN ISTAT mm HG 37 0   HCO3, JASKARAN ISTAT mmol/L 17 6*   I STAT BASE EXC mmol/L -8*   I STAT O2 SAT % 65         Results from last 7 days   Lab Units 08/15/22  1614 08/15/22  1405   HS TNI 0HR ng/L  --  9   HS TNI 2HR ng/L 15  --    HSTNI D2 ng/L 6  --          Results from last 7 days   Lab Units 08/15/22  1405   PROTIME seconds 15 6*   INR  1 27*         Results from last 7 days   Lab Units 08/16/22  0433 08/15/22  1657   PROCALCITONIN ng/ml 0 62* 0 44*       Results from last 7 days   Lab Units 08/15/22  1441   CLARITY UA  Clear   COLOR UA  Yellow   SPEC GRAV UA  1 020   PH UA  5 0   GLUCOSE UA mg/dl >=1000 (1%)*   KETONES UA mg/dl 40 (2+)*   BLOOD UA  Trace-Intact*   PROTEIN UA mg/dl Negative   NITRITE UA  Negative   BILIRUBIN UA  Negative   UROBILINOGEN UA E U /dl 0 2   LEUKOCYTES UA  Elevated glucose may cause decreased leukocyte values   See urine microscopic for [facility] result/*   WBC UA /hpf None Seen   RBC UA /hpf None Seen   BACTERIA UA /hpf None Seen EPITHELIAL CELLS WET PREP /hpf Occasional       Results from last 7 days   Lab Units 08/15/22  1649   AMPH/METH  Negative   BARBITURATE UR  Negative   BENZODIAZEPINE UR  Negative   COCAINE UR  Negative   METHADONE URINE  Negative   OPIATE UR  Negative   PCP UR  Negative   THC UR  Negative     ED Treatment:   Medication Administration from 08/15/2022 1334 to 08/15/2022 1737       Date/Time Order Dose Route Action     08/15/2022 1407 sodium chloride 0 9 % bolus 1,000 mL 1,000 mL Intravenous New Bag     08/15/2022 1407 multi-electrolyte (ISOLYTE-S PH 7 4) bolus 1,000 mL 1,000 mL Intravenous New Bag     08/15/2022 1534 insulin regular (HumuLIN R,NovoLIN R) 1 Units/mL in sodium chloride 0 9 % 100 mL infusion 6 9 Units/hr Intravenous New Bag     08/15/2022 1654 carvedilol (COREG) tablet 25 mg 25 mg Oral Given     08/15/2022 1654 pravastatin (PRAVACHOL) tablet 40 mg 40 mg Oral Given     08/15/2022 1652 insulin regular (HumuLIN R,NovoLIN R) 1 Units/mL in sodium chloride 0 9 % 100 mL infusion 6 9 Units/hr Intravenous New Bag     08/15/2022 1701 lactated ringers infusion 500 mL/hr Intravenous New Bag     08/15/2022 1655 heparin (porcine) subcutaneous injection 5,000 Units 5,000 Units Subcutaneous Given        Past Medical History:   Diagnosis Date    Diabetes mellitus ([facility] [address] [facility] )     Hypertension      Admitting Diagnosis: Hyperglycemia [R73 9]  Type 2 diabetes mellitus with hyperglycemia, with long-term current use of insulin (HCC) [E11 65, Z79 4]  Age/Sex: 66 y o  female  Admission Orders:  CBC, Mag, phos, bmp  CDS  Neuro checks every shift  Scheduled Medications:  amLODIPine, 10 mg, Oral, Daily  carvedilol, 25 mg, Oral, BID With Meals  heparin (porcine), 5,000 Units, Subcutaneous, Q8H [facility]  potassium-sodium phosphateS, 1 tablet, Oral, BID With Meals  pravastatin, 40 mg, Oral, Daily With Dinner      Continuous IV Infusions:  insulin regular (HumuLIN R,NovoLIN R) infusion, 0 3-21 Units/hr, Intravenous, Titrated    NSS 0 45% infusion at 150 ml/hr discontinued 8/16  PRN Meds:  hydrALAZINE, 5 mg, Intravenous, Q6H PRN  sodium chloride (PF), 3 mL, Intravenous, Q1H PRN        IP CONSULT TO CASE MANAGEMENT  IP CONSULT TO NUTRITION SERVICES  IP CONSULT TO ENDOCRINOLOGY    Network Utilization Review Department  ATTENTION: Please call with any questions or concerns to [phone] and carefully listen to the prompts so that you are directed to the right person  All voicemails are confidential   Marina Ambrose all requests for admission clinical reviews, approved or denied determinations and any other requests to dedicated fax number below belonging to the campus where the patient is receiving treatment   List of dedicated fax numbers for the Facilities:  [address] DENIALS (Administrative/Medical Necessity) [phone]   [address] (Maternity/NICU/Pediatrics) [phone]   [address]  [address] [address] [place] [address] [address] [facility] [place] [address] [phone]

## 2022-08-16 NOTE — CASE MANAGEMENT
Case Management Assessment & Discharge Planning Note    Patient name Amarjit Patel  Location / MRN [id number]  : 1950 Date 2022       Current Admission Date: 8/15/2022  Current Admission Diagnosis:Hyperglycemic crisis due to diabetes mellitus [facility])   Patient Active Problem List    Diagnosis Date Noted    Type 2 diabetes mellitus with hyperglycemia, with long-term current use of insulin ([facility] ) 08/15/2022    Acute kidney injury superimposed on chronic kidney disease ([facility] ) 08/15/2022    Hyperglycemic crisis due to diabetes mellitus ([facility] ) 2020    Stage 3a chronic kidney disease ([facility] ) 2018    Hypertension associated with diabetes ([facility] ) 2018    Hyperlipidemia associated with type 2 diabetes mellitus ([facility] ) 2018    Aortic insufficiency 2018      LOS (days): 1  Geometric Mean LOS (GMLOS) (days):   Days to GMLOS:     OBJECTIVE:    Risk of Unplanned Readmission Score: 12 33         Current admission status: Inpatient       Preferred Pharmacy:   [facility] [address] MANUEL HARPER [address] - [place] [place] [address]  Phone: [phone] Fax: [phone]    Primary Care Provider: No primary care provider on file      Primary Insurance:   Secondary Insurance:     ASSESSMENT:  [address] [facility], [address] [facility] Representative - Daughter   Primary Phone: [phone] (Mobile)               Advance Directives  Does patient have a [address] [place] [address] Avenue?: No  Was patient offered paperwork?: Yes (Patient declined paperwork at this time, because she believes the  [facility] already has this information )  Does patient currently have a Health Care decision maker?: Yes, please see Health Care Proxy section (Patient identified her daughter Tate Roa as her health care representative )  Does patient have Advance Directives?: Yes  Advance Directives: Living will  Primary Contact: Patient's Daughter Tate Roa    formerly [facility] Cause  30 Day Readmission: No    Patient Information  Admitted from[de-identified] Home  Mental Status: Alert  During Assessment patient was accompanied by: Daughter  Assessment information provided by[de-identified] Patient  Primary Caregiver: Self  Support Systems: Daughter, Children, Family members  [place] of Residence: [place] [address] do you live in?: [address] entry access options   Select all that apply : Stairs  Number of steps to enter home : 2  Do the steps have railings?: Yes  Type of Current Residence: 3 story home  Upon entering residence, is there a bedroom on the main floor (no further steps)?: No  A bedroom is located on the following floor levels of residence (select all that apply):: 2nd Floor  Upon entering residence, is there a bathroom on the main floor (no further steps)?: Yes  Number of steps to 2nd floor from main floor: 10  In the last 12 months, was there a time when you were not able to pay the mortgage or rent on time?: No  In the last 12 months, how many places have you lived?: 1  In the last 12 months, was there a time when you did not have a steady place to sleep or slept in a shelter (including now)?: No  Homeless/housing insecurity resource given?: N/A  Living Arrangements: Lives w/ Daughter  Is patient a ?: No    Activities of Daily Living Prior to Admission  Functional Status: Independent  Completes ADLs independently?: Yes  Ambulates independently?: Yes  Does patient use assisted devices?: No  Does patient currently own DME?: No  Does patient have a history of Outpatient Therapy (PT/OT)?: No  Does the patient have a history of Short-Term Rehab?: No  Does patient have a history of HHC?: No  Does patient currently have [facility]?: No    Patient Information Continued  Income Source: Pension/alf (Patient reported that she is retired, but she still works part time )  Does patient have prescription coverage?: Yes (Patient reported that she uses [id number] in [facility], and she denied any barriers to obtaining or affording prescriptions )  Within the past 12 months, you worried that your food would run out before you got the money to buy more : Never true  Within the past 12 months, the food you bought just didn't last and you didn't have money to get more : Never true  Food insecurity resource given?: N/A  Does patient receive dialysis treatments?: No  Does patient have a history of substance abuse?: No  Does patient have a history of Mental Health Diagnosis?: No    PHQ 2/9 Screening   Reviewed PHQ 2/9 Depression Screening Score?: No    Means of Transportation  Means of Transport to Appts[de-identified] Family transport  In the past 12 months, has lack of transportation kept you from medical appointments or from getting medications?: No  In the past 12 months, has lack of transportation kept you from meetings, work, or from getting things needed for daily living?: No  Was application for public transport provided?: N/A    DISCHARGE DETAILS:    Discharge planning discussed with[de-identified] Patient and Patient's Daughter  Freedom of Choice: Yes  Comments - Freedom of Choice: CM discussed freedom of choice as it pertains to discharge planning  Patient reported that she is interested in VNA at discharge and denied any preferences as to which agency she wishes to use  Patient was agreeable with CM sending a blanket referral   CM contacted family/caregiver?: Yes  Were Treatment Team discharge recommendations reviewed with patient/caregiver?: Yes  Did patient/caregiver verbalize understanding of patient care needs?: Yes  Were patient/caregiver advised of the risks associated with not following Treatment Team discharge recommendations?: Yes    Contacts  Patient Contacts: Yahir   Relationship to Patient[de-identified] Family  Contact Method:  In Person  Reason/Outcome: Continuity of Care, Discharge [address]         Is the patient interested in [facility] at discharge?: Yes  [address] requested[de-identified] [address] Agency Name[de-identified] Other ([place] referrals sent )  [address] Provider[de-identified] PCP  Home Health Services Needed[de-identified] Evaluate Functional Status and Safety, Diabetes Management  Homebound Criteria Met[de-identified] Requires the Assistance of Another Person for Safe Ambulation or to Leave the Home  Supporting Clincal Findings[de-identified] Fatigues Easliy in Short Distances, Limited Endurance    DME Referral Provided  Referral made for DME?: No    Other Referral/Resources/Interventions Provided:  Financial Resources Provided: Other (Comment) (CM sent an email to inpatient insurance verification asking them to verify patient's insurance  Per patient, she has [address] [employer] insurance and Medicare and plans to bring her insurance cards in tomorrow )  Interventions: [facility]  Referral Comments: RANJIT sent a blanket referral to [facility] agencies via [employer] to determine who can accept patient      Would you like to participate in our [address] service program?  : No - Declined    Treatment Team Recommendation: Home with [facility]  Discharge Destination Plan[de-identified] Home with Joe at Discharge : Family

## 2022-08-16 NOTE — PLAN OF CARE
Pt educated on importance of taking medication as prescribed at home and importance of diet and foods in moderation  Will continue to educate

## 2022-08-16 NOTE — ASSESSMENT & PLAN NOTE
Lab Results   Component Value Date    HGBA1C 12 0 (H) 08/15/2022       Recent Labs     08/15/22  1947 08/15/22  2044 08/15/22  2144 08/15/22  2250   POCGLU 326* 279* 219* 207*       Blood Sugar Average: Last 72 hrs:  (P) 280 6   ACE-I on hold given LADARIUS  Continue beta blocker and resume norvasc in the morning as patient's BP trending up  Hydralazine PRN SBP >180  Monitor BP trend and give IV prn if needed for BP > 180

## 2022-08-16 NOTE — ASSESSMENT & PLAN NOTE
Lab Results   Component Value Date    HGBA1C 12 0 (H) 08/15/2022       Recent Labs     08/15/22  1947 08/15/22  2044 08/15/22  2144 08/15/22  2250   POCGLU 326* 279* 219* 207*       Blood Sugar Average: Last 72 hrs:  (P) 280 6   See hyperglycemic emergency plan  At family request will consult endocrine given desire for new provider

## 2022-08-16 NOTE — PLAN OF CARE
Demonstrated to patient how to check blood glucose and when to check  Pt stated understanding and all questions answered

## 2022-08-16 NOTE — ASSESSMENT & PLAN NOTE
Lab Results   Component Value Date    HGBA1C 12 0 (H) 08/15/2022       Recent Labs     08/15/22  1947 08/15/22  2044 08/15/22  2144 08/15/22  2250   POCGLU 326* 279* 219* 207*       Blood Sugar Average: Last 72 hrs:  (P) 280 6  Treat per DKA protocol  There is evidence of ketosis and only mild acidosis  Degree of hyperglycemia favors hyperglycemic emergency  Regardless, treated the same with aggressive hydration and IV insulin  Follow electrolytes per protocol  No angina, EKG with T wave changes in inferior, and negative HS troponin  UA and CXR without evidence of infection  Previously on metformin alone  Patient expresses hesitancy related to insulin injections  Consult Endocrinology

## 2022-08-17 LAB
ANION GAP SERPL CALCULATED.3IONS-SCNC: 4 MMOL/L (ref 4–13)
BUN SERPL-MCNC: 26 MG/DL (ref 5–25)
CA-I BLD-SCNC: 1.18 MMOL/L (ref 1.12–1.32)
CALCIUM SERPL-MCNC: 9.2 MG/DL (ref 8.3–10.1)
CHLORIDE SERPL-SCNC: 107 MMOL/L (ref 96–108)
CO2 SERPL-SCNC: 28 MMOL/L (ref 21–32)
CREAT SERPL-MCNC: 1.25 MG/DL (ref 0.6–1.3)
ERYTHROCYTE [DISTWIDTH] IN BLOOD BY AUTOMATED COUNT: 13.2 % (ref 11.6–15.1)
GFR SERPL CREATININE-BSD FRML MDRD: 42 ML/MIN/1.73SQ M
GLUCOSE SERPL-MCNC: 166 MG/DL (ref 65–140)
GLUCOSE SERPL-MCNC: 188 MG/DL (ref 65–140)
GLUCOSE SERPL-MCNC: 194 MG/DL (ref 65–140)
GLUCOSE SERPL-MCNC: 205 MG/DL (ref 65–140)
GLUCOSE SERPL-MCNC: 207 MG/DL (ref 65–140)
GLUCOSE SERPL-MCNC: 321 MG/DL (ref 65–140)
GLUCOSE SERPL-MCNC: 322 MG/DL (ref 65–140)
GLUCOSE SERPL-MCNC: 337 MG/DL (ref 65–140)
HCT VFR BLD AUTO: 39.4 % (ref 34.8–46.1)
HGB BLD-MCNC: 12.7 G/DL (ref 11.5–15.4)
MAGNESIUM SERPL-MCNC: 2.2 MG/DL (ref 1.6–2.6)
MCH RBC QN AUTO: 28.3 PG (ref 26.8–34.3)
MCHC RBC AUTO-ENTMCNC: 32.2 G/DL (ref 31.4–37.4)
MCV RBC AUTO: 88 FL (ref 82–98)
PHOSPHATE SERPL-MCNC: 3.2 MG/DL (ref 2.3–4.1)
PLATELET # BLD AUTO: 160 THOUSANDS/UL (ref 149–390)
PMV BLD AUTO: 14 FL (ref 8.9–12.7)
POTASSIUM SERPL-SCNC: 4.1 MMOL/L (ref 3.5–5.3)
RBC # BLD AUTO: 4.48 MILLION/UL (ref 3.81–5.12)
SODIUM SERPL-SCNC: 139 MMOL/L (ref 135–147)
WBC # BLD AUTO: 7.91 THOUSAND/UL (ref 4.31–10.16)

## 2022-08-17 PROCEDURE — 83735 ASSAY OF MAGNESIUM: CPT | Performed by: PHYSICIAN ASSISTANT

## 2022-08-17 PROCEDURE — 82330 ASSAY OF CALCIUM: CPT | Performed by: PHYSICIAN ASSISTANT

## 2022-08-17 PROCEDURE — 99233 SBSQ HOSP IP/OBS HIGH 50: CPT

## 2022-08-17 PROCEDURE — 82948 REAGENT STRIP/BLOOD GLUCOSE: CPT

## 2022-08-17 PROCEDURE — 85027 COMPLETE CBC AUTOMATED: CPT | Performed by: PHYSICIAN ASSISTANT

## 2022-08-17 PROCEDURE — 80048 BASIC METABOLIC PNL TOTAL CA: CPT | Performed by: PHYSICIAN ASSISTANT

## 2022-08-17 PROCEDURE — 84100 ASSAY OF PHOSPHORUS: CPT | Performed by: PHYSICIAN ASSISTANT

## 2022-08-17 RX ORDER — INSULIN LISPRO 100 [IU]/ML
5 INJECTION, SOLUTION INTRAVENOUS; SUBCUTANEOUS
Status: DISCONTINUED | OUTPATIENT
Start: 2022-08-18 | End: 2022-08-18 | Stop reason: HOSPADM

## 2022-08-17 RX ORDER — INSULIN GLARGINE 100 [IU]/ML
15 INJECTION, SOLUTION SUBCUTANEOUS
Qty: 3 ML | Refills: 0 | Status: SHIPPED | OUTPATIENT
Start: 2022-08-17 | End: 2022-08-18

## 2022-08-17 RX ORDER — INSULIN GLARGINE 100 [IU]/ML
18 INJECTION, SOLUTION SUBCUTANEOUS
Status: DISCONTINUED | OUTPATIENT
Start: 2022-08-17 | End: 2022-08-18

## 2022-08-17 RX ORDER — INSULIN GLARGINE 100 [IU]/ML
20 INJECTION, SOLUTION SUBCUTANEOUS
Status: DISCONTINUED | OUTPATIENT
Start: 2022-08-17 | End: 2022-08-17

## 2022-08-17 RX ORDER — INSULIN LISPRO 100 [IU]/ML
5 INJECTION, SOLUTION INTRAVENOUS; SUBCUTANEOUS
Status: DISCONTINUED | OUTPATIENT
Start: 2022-08-17 | End: 2022-08-18 | Stop reason: HOSPADM

## 2022-08-17 RX ORDER — HEPARIN SODIUM 5000 [USP'U]/ML
5000 INJECTION, SOLUTION INTRAVENOUS; SUBCUTANEOUS EVERY 8 HOURS SCHEDULED
Status: DISCONTINUED | OUTPATIENT
Start: 2022-08-17 | End: 2022-08-18 | Stop reason: HOSPADM

## 2022-08-17 RX ADMIN — HEPARIN SODIUM 5000 UNITS: 5000 INJECTION INTRAVENOUS; SUBCUTANEOUS at 04:59

## 2022-08-17 RX ADMIN — INSULIN LISPRO 1 UNITS: 100 INJECTION, SOLUTION INTRAVENOUS; SUBCUTANEOUS at 22:00

## 2022-08-17 RX ADMIN — METFORMIN HYDROCHLORIDE 500 MG: 500 TABLET ORAL at 18:00

## 2022-08-17 RX ADMIN — INSULIN GLARGINE 18 UNITS: 100 INJECTION, SOLUTION SUBCUTANEOUS at 21:59

## 2022-08-17 RX ADMIN — AMLODIPINE BESYLATE 10 MG: 10 TABLET ORAL at 08:45

## 2022-08-17 RX ADMIN — GLIMEPIRIDE 4 MG: 2 TABLET ORAL at 08:45

## 2022-08-17 RX ADMIN — GLIMEPIRIDE 4 MG: 2 TABLET ORAL at 18:00

## 2022-08-17 RX ADMIN — INSULIN LISPRO 1 UNITS: 100 INJECTION, SOLUTION INTRAVENOUS; SUBCUTANEOUS at 08:41

## 2022-08-17 RX ADMIN — HEPARIN SODIUM 5000 UNITS: 5000 INJECTION INTRAVENOUS; SUBCUTANEOUS at 20:39

## 2022-08-17 RX ADMIN — INSULIN LISPRO 4 UNITS: 100 INJECTION, SOLUTION INTRAVENOUS; SUBCUTANEOUS at 17:57

## 2022-08-17 RX ADMIN — CARVEDILOL 25 MG: 12.5 TABLET, FILM COATED ORAL at 18:00

## 2022-08-17 RX ADMIN — INSULIN LISPRO 3 UNITS: 100 INJECTION, SOLUTION INTRAVENOUS; SUBCUTANEOUS at 12:01

## 2022-08-17 RX ADMIN — DIBASIC SODIUM PHOSPHATE, MONOBASIC POTASSIUM PHOSPHATE AND MONOBASIC SODIUM PHOSPHATE 1 TABLET: 852; 155; 130 TABLET ORAL at 17:59

## 2022-08-17 RX ADMIN — CARVEDILOL 25 MG: 12.5 TABLET, FILM COATED ORAL at 08:46

## 2022-08-17 RX ADMIN — HEPARIN SODIUM 5000 UNITS: 5000 INJECTION INTRAVENOUS; SUBCUTANEOUS at 14:26

## 2022-08-17 RX ADMIN — DIBASIC SODIUM PHOSPHATE, MONOBASIC POTASSIUM PHOSPHATE AND MONOBASIC SODIUM PHOSPHATE 1 TABLET: 852; 155; 130 TABLET ORAL at 08:45

## 2022-08-17 RX ADMIN — INSULIN LISPRO 5 UNITS: 100 INJECTION, SOLUTION INTRAVENOUS; SUBCUTANEOUS at 17:58

## 2022-08-17 RX ADMIN — PRAVASTATIN SODIUM 40 MG: 40 TABLET ORAL at 18:03

## 2022-08-17 NOTE — QUICK NOTE
Blood sugars are still running high with some into the 300s  It definitely will take more than 24 hours to see full effect of medications  Will though add metformin 500 mg twice a day, they may need to crush it in applesauce for her so she could swallow it and she may need liquid form when discharged

## 2022-08-17 NOTE — ASSESSMENT & PLAN NOTE
Lab Results   Component Value Date    HGBA1C 12 0 (H) 08/15/2022       Recent Labs     08/16/22  2140 08/16/22  2303 08/16/22  2354 08/17/22  0534   POCGLU 397* 271* 205* 188*       Blood Sugar Average: Last 72 hrs:  (P) 300 [id number]     Blood Sugar Average: Last 72 hrs:  (P) 215 13  See hyperglycemic emergency plan  At family request will consult endocrine given desire for new provider

## 2022-08-17 NOTE — ASSESSMENT & PLAN NOTE
Lab Results   Component Value Date    HGBA1C 12 0 (H) 08/15/2022       Recent Labs     08/16/22  2140 08/16/22  2303 08/16/22  2354 08/17/22  0534   POCGLU 397* 271* 205* 188*       Blood Sugar Average: Last 72 hrs:  (P) 515 [id number]     Blood Sugar Average: Last 72 hrs:  (P) 215 13  Treat per DKA protocol  There is evidence of ketosis and only mild acidosis  Degree of hyperglycemia favors hyperglycemic emergency  Regardless, treated the same with aggressive hydration and IV insulin  Follow electrolytes per protocol  No angina, EKG with T wave changes in inferior, and negative HS troponin  UA and CXR without evidence of infection  Previously on metformin alone  Patient expresses hesitancy related to insulin injections  Consult Endocrinology

## 2022-08-17 NOTE — PLAN OF CARE
Problem: Potential for Falls  Goal: Patient will remain free of falls  Description: INTERVENTIONS:  - Educate patient/family on patient safety including physical limitations  - Instruct patient to call for assistance with activity   - Consult OT/PT to assist with strengthening/mobility   - Keep Call bell within reach  - Keep bed low and locked with side rails adjusted as appropriate  - Keep care items and personal belongings within reach  - Initiate and maintain comfort rounds  - Make Fall Risk Sign visible to staff  - Offer Toileting every 2 Hours, in advance of need  - Initiate/Maintain Bed alarm  - Apply yellow socks and bracelet for high fall risk patients  - Consider moving patient to room near nurses station  Outcome: Progressing     Problem: PAIN - ADULT  Goal: Verbalizes/displays adequate comfort level or baseline comfort level  Description: Interventions:  - Encourage patient to monitor pain and request assistance  - Assess pain using appropriate pain scale  - Administer analgesics based on type and severity of pain and evaluate response  - Implement non-pharmacological measures as appropriate and evaluate response  - Consider cultural and social influences on pain and pain management  - Notify physician/advanced practitioner if interventions unsuccessful or patient reports new pain  Outcome: Progressing     Problem: INFECTION - ADULT  Goal: Absence or prevention of progression during hospitalization  Description: INTERVENTIONS:  - Assess and monitor for signs and symptoms of infection  - Monitor lab/diagnostic results  - Monitor all insertion sites, i e  indwelling lines, tubes, and drains  - Monitor endotracheal if appropriate and nasal secretions for changes in amount and color  - [place] appropriate cooling/warming therapies per order  - Administer medications as ordered  - Instruct and encourage patient and family to use good hand hygiene technique  - Identify and instruct in appropriate isolation precautions for identified infection/condition  Outcome: Progressing     Problem: SAFETY ADULT  Goal: Patient will remain free of falls  Description: INTERVENTIONS:  - Educate patient/family on patient safety including physical limitations  - Instruct patient to call for assistance with activity   - Consult OT/PT to assist with strengthening/mobility   - Keep Call bell within reach  - Keep bed low and locked with side rails adjusted as appropriate  - Keep care items and personal belongings within reach  - Initiate and maintain comfort rounds  - Make Fall Risk Sign visible to staff  - Offer Toileting every 2 Hours, in advance of need  - Initiate/Maintain bed alarm  - Apply yellow socks and bracelet for high fall risk patients  - Consider moving patient to room near nurses station  Outcome: Progressing     Problem: DISCHARGE PLANNING  Goal: Discharge to home or other facility with appropriate resources  Description: INTERVENTIONS:  - Identify barriers to discharge w/patient and caregiver  - Arrange for needed discharge resources and transportation as appropriate  - Identify discharge learning needs (meds, wound care, etc )  - Arrange for interpretive services to assist at discharge as needed  - Refer to Case Management Department for coordinating discharge planning if the patient needs post-hospital services based on physician/advanced practitioner order or complex needs related to functional status, cognitive ability, or social support system  Outcome: Progressing     Problem: Knowledge Deficit  Goal: Patient/family/caregiver demonstrates understanding of disease process, treatment plan, medications, and discharge instructions  Description: Complete learning assessment and assess knowledge base    Interventions:  - Provide teaching at level of understanding  - Provide teaching via preferred learning methods  Outcome: Progressing     Problem: METABOLIC, FLUID AND ELECTROLYTES - ADULT  Goal: Glucose maintained within target range  Description: INTERVENTIONS:  - Monitor Blood Glucose as ordered  - Assess for signs and symptoms of hyperglycemia and hypoglycemia  - Administer ordered medications to maintain glucose within target range  - Assess nutritional intake and initiate nutrition service referral as needed  Outcome: Progressing     Problem: Nutrition/Hydration-ADULT  Goal: Nutrient/Hydration intake appropriate for improving, restoring or maintaining nutritional needs  Description: Monitor and assess patient's nutrition/hydration status for malnutrition  Collaborate with interdisciplinary team and initiate plan and interventions as ordered  Monitor patient's weight and dietary intake as ordered or per policy  Utilize nutrition screening tool and intervene as necessary  Determine patient's food preferences and provide high-protein, high-caloric foods as appropriate       INTERVENTIONS:  - Monitor oral intake, urinary output, labs, and treatment plans  - Assess nutrition and hydration status and recommend course of action  - Evaluate amount of meals eaten  - Assist patient with eating if necessary   - Allow adequate time for meals  - Recommend/ encourage appropriate diets, oral nutritional supplements, and vitamin/mineral supplements  - Order, calculate, and assess calorie counts as needed  - Assess need for intravenous fluids  - Provide nutrition/hydration education as appropriate  - Include patient/family/caregiver in decisions related to nutrition  Outcome: Progressing

## 2022-08-17 NOTE — ASSESSMENT & PLAN NOTE
Lab Results   Component Value Date    HGBA1C 12 0 (H) 08/15/2022       Recent Labs     08/16/22  2303 08/16/22  2354 08/17/22  0534 08/17/22  0704   POCGLU 271* 205* 188* 194*       Blood Sugar Average: Last 72 hrs:  (P) 214 25     See hyperglycemic emergency plan    Obtain anti-islet cell, glutamic decarboxylase, and anti-insulin antibodies for further work-up of diabetes etiology

## 2022-08-17 NOTE — ASSESSMENT & PLAN NOTE
Lab Results   Component Value Date    EGFR 42 08/17/2022    EGFR 41 08/16/2022    EGFR 32 08/16/2022    CREATININE 1 25 08/17/2022    CREATININE 1 28 08/16/2022    CREATININE 1 56 (H) 08/16/2022     Likely d/t diabetic emergency  Continue hydration  Monitor urine output, acid/base status  Consider renal US if no improvement despite adequate hydration  ACE, diuretics currently held but can consider restarting d/t downtrending Cr, today is 1 08 wnl

## 2022-08-17 NOTE — DISCHARGE SUMMARY
[address] [facility]  Discharge- Fela Urias 1950, 66 y o  female MRN: [id number]  Unit/Bed#:  Encounter: [id number]  Primary Care Provider: No primary care provider on file  Date and time admitted to hospital: 8/15/2022  6:62 PM    Metabolic encephalopathy  Assessment & Plan  Likely secondary to hyperglycemia, and in the setting of diabetes mellitus  Present on admission, but resolved as of today's assessment  Acute kidney injury superimposed on chronic kidney disease [facility])  Assessment & Plan  Lab Results   Component Value Date    EGFR 51 08/18/2022    EGFR 42 08/17/2022    EGFR 41 08/16/2022    CREATININE 1 08 08/18/2022    CREATININE 1 25 08/17/2022    CREATININE 1 28 08/16/2022     Resolved prior to discharge  Aortic insufficiency  Assessment & Plan  Moderate aortic insufficiency noted in 2014 with no f/u since then  Continue to monitor    Hyperlipidemia associated with type 2 diabetes mellitus [facility])  Assessment & Plan  Lab Results   Component Value Date    HGBA1C 12 0 (H) 08/15/2022       Recent Labs     08/17/22  2147 08/18/22  0622 08/18/22  0648 08/18/22  1118   POCGLU 166* 69 126 272*       Blood Sugar Average: Last 72 hrs:  (P) 959 [id number]     Continue statin  Hypertension associated with diabetes [facility])  Assessment & Plan  Lab Results   Component Value Date    HGBA1C 12 0 (H) 08/15/2022       Recent Labs     08/17/22  2147 08/18/22  0622 08/18/22  0648 08/18/22  1118   POCGLU 166* 69 126 272*       Blood Sugar Average: Last 72 hrs:  (P) 316 [id number]     Continue pre-admission antihypertensive regimen at discharge      Type 2 diabetes mellitus with hyperglycemia, with long-term current use of insulin [facility])  Assessment & Plan  Lab Results   Component Value Date    HGBA1C 12 0 (H) 08/15/2022       Recent Labs     08/17/22  2147 08/18/22  0622 08/18/22  0648 08/18/22  1118   POCGLU 166* 69 126 272*       Blood Sugar Average: Last 72 hrs:  (P) 707 [id number]     See hyperglycemic emergency plan  Obtain anti-islet cell, glutamic decarboxylase, and anti-insulin antibodies for further work-up of diabetes etiology  Will discharge with a regimen of NPH 10 units by mouth twice a day, in addition to metformin at 500 mg twice a day  Recommendations made to follow-up with endocrinology on discharge  * Hyperglycemic crisis due to diabetes mellitus [facility])  Assessment & Plan  Lab Results   Component Value Date    HGBA1C 12 0 (H) 08/15/2022       Recent Labs     08/17/22  2147 08/18/22  0622 08/18/22  0648 08/18/22  1118   POCGLU 166* 69 126 272*       Blood Sugar Average: Last 72 hrs:  (P) 836 [id number]     Evidence of ketosis and mild acidosis  Degree of hyperglycemia favors hyperglycemic emergency  Treated with aggressive hydration and IV insulin  Continue following electrolytes  No evidence of angina, negative HS troponin  No evidence of infection on UA and CXR   Previously on metformin alone  Restarted metformin yesterday evening  Patient was able to self-administer two doses of insulin yesterday  Consider adding basal insulin to patient's outpatient regimen  Medical Problems             Resolved Problems  Date Reviewed: 8/17/2022   None               Physical Exam  Constitutional:       General: She is not in acute distress  Appearance: Normal appearance  HENT:      Head: Normocephalic and atraumatic  Cardiovascular:      Rate and Rhythm: Normal rate and regular rhythm  Heart sounds: Normal heart sounds  No murmur heard  Pulmonary:      Effort: Pulmonary effort is normal  No respiratory distress  Breath sounds: Normal breath sounds  Abdominal:      General: Abdomen is flat  Bowel sounds are normal       Palpations: Abdomen is soft  Tenderness: There is no abdominal tenderness  There is no guarding or rebound  Skin:     General: Skin is warm and dry     Neurological:      Mental Status: She is alert and oriented to person, place, and time  Psychiatric:         Mood and Affect: Mood normal          Behavior: Behavior normal          Thought Content: Thought content normal            Admission Date:   Admission Orders (From admission, onward)     Ordered        08/15/22 1543  Inpatient Admission  Once                        Admitting Diagnosis: Hyperglycemia [R73 9]  Type 2 diabetes mellitus with hyperglycemia, with long-term current use of insulin ([facility] [address] ) [E11 65, Z79 4]    HPI:   Iram Espinoza is a 66 y o  female with a h/o T2DM and HTN, who presented to the ED Monday 8/15/22 with generalized weakness and altered mental status  She had been helping her daughter move and became dehydrated, weak, and confused  Has been taking metformin for DM only  Denies CP, SOB, ROUSSEAU, urinary frequency, urgency, dysuria  Procedures Performed: No orders of the defined types were placed in this encounter  Summary of Hospital Course: In the ED, serum glucose was elevated at 833  Anion gap of 22, bicarb 19, BUN 50, Cr 2 3 indicative of LADARIUS; Venous blood gas showed pH 7 3, pCO2 of 36, pO2 37  Mg & phos elevated at 4 9 and 2 2, respectively  UA showed glucosuria > 1000, ketones 6 3, and increased leukocytes  PT/INR were increased at 15 and 1 3, respectively  Patient was given fluids, NaCl 0 9 bolus, multi-electrolyte bolus, and insulin  Overnight, patient reports no acute events, slept well  Today, she is alert and oriented, and eating breakfast  She feels much better and endorses a good appetite as well as being able to ambulate independently  Denies fever, headache, chest pain, SOB, abdominal pain, or any urinary complaints  Reports no pain anywhere  Complications: None    Condition at Discharge: good     Discharge instructions/Information to patient and family:   See after visit summary for information provided to patient and family        Provisions for Follow-Up Care:  See after visit summary for information related to follow-up care and any pertinent home health orders  PCP: No primary care provider on file  Disposition: Home    Planned Readmission: No    Discharge Statement   I spent 20 minutes discharging the patient  This time was spent on the day of discharge  I had direct contact with the patient on the day of discharge  Additional documentation is required if more than 30 minutes were spent on discharge  Discharge Medications:  See after visit summary for reconciled discharge medications provided to patient and family

## 2022-08-17 NOTE — ASSESSMENT & PLAN NOTE
Lab Results   Component Value Date    EGFR 42 08/17/2022    EGFR 41 08/16/2022    EGFR 32 08/16/2022    CREATININE 1 25 08/17/2022    CREATININE 1 28 08/16/2022    CREATININE 1 56 (H) 08/16/2022     · Likely due to diabetic emergency  · Continue hydration monitor urine output, Acid-base status, and check renal US if does not improve with hydration  · Hold ACE, diuretic    · Creatinine is trending down  · Trend renal indices

## 2022-08-17 NOTE — ASSESSMENT & PLAN NOTE
Lab Results   Component Value Date    HGBA1C 12 0 (H) 08/15/2022       Recent Labs     08/16/22  2303 08/16/22  2354 08/17/22  0534 08/17/22  0704   POCGLU 271* 205* 188* 194*       Blood Sugar Average: Last 72 hrs:  (P) 214 25     ACEi on hold given LADARIUS  Continue B-blocker and Norvasc d/t uptrending BP  Hydralazine PRN if SBP > 180

## 2022-08-17 NOTE — ASSESSMENT & PLAN NOTE
Lab Results   Component Value Date    HGBA1C 12 0 (H) 08/15/2022       Recent Labs     08/16/22  2303 08/16/22  2354 08/17/22  0534 08/17/22  0704   POCGLU 271* 205* 188* 194*       Blood Sugar Average: Last 72 hrs:  (P) 214 25     Evidence of ketosis and mild acidosis  Degree of hyperglycemia favors hyperglycemic emergency  Treated with aggressive hydration and IV insulin  Continue following electrolytes  No evidence of angina, negative HS troponin  No evidence of infection on UA and CXR   Previously on metformin alone  Restarted metformin yesterday evening  Patient was able to self-administer two doses of insulin yesterday  Consider adding basal insulin to patient's outpatient regimen

## 2022-08-17 NOTE — ASSESSMENT & PLAN NOTE
----- Message from Elvira Rosales sent at 12/13/2019  2:23 PM CST -----  Contact: AL MCNEIL [[id number]]  RIASuburban Community Hospital & Brentwood HospitalMOODY: [phone]                   Lab Results   Component Value Date    HGBA1C 12 0 (H) 08/15/2022       Recent Labs     08/16/22  2140 08/16/22  2303 08/16/22  2354 08/17/22  0534   POCGLU 397* 271* 205* 188*       Blood Sugar Average: Last 72 hrs:  (P) 029 [id number]     Blood Sugar Average: Last 72 hrs:  (P) 215 13  Treat per DKA protocol  There is evidence of ketosis and only mild acidosis  Degree of hyperglycemia favors hyperglycemic emergency  Regardless, treated the same with aggressive hydration and IV insulin  Follow electrolytes per protocol  No angina, EKG with T wave changes in inferior, and negative HS troponin  UA and CXR without evidence of infection  Previously on metformin alone  Patient expresses hesitancy related to insulin injections  Consult Endocrinology

## 2022-08-17 NOTE — PROGRESS NOTES
[address] [facility]  Progress Note - Dominique Brown 1950, 66 y o  female MRN: [id number]  Unit/Bed#:  Encounter: [id number]  Primary Care Provider: No primary care provider on file  Date and time admitted to hospital: 8/15/2022  1:34 PM    Acute kidney injury superimposed on chronic kidney disease [facility])  Assessment & Plan  Lab Results   Component Value Date    EGFR 42 08/17/2022    EGFR 41 08/16/2022    EGFR 32 08/16/2022    CREATININE 1 25 08/17/2022    CREATININE 1 28 08/16/2022    CREATININE 1 56 (H) 08/16/2022     · Likely due to diabetic emergency  · Continue hydration monitor urine output, Acid-base status, and check renal US if does not improve with hydration  · Hold ACE, diuretic  · Creatinine is trending down  · Trend renal indices       Aortic insufficiency  Assessment & Plan  · Mod AI noted in 2014 with no follow up since that time  · Continue to monitor      Hyperlipidemia associated with type 2 diabetes mellitus [facility])  Assessment & Plan  Lab Results   Component Value Date    HGBA1C 12 0 (H) 08/15/2022       Recent Labs     08/16/22  2140 08/16/22  2303 08/16/22  2354 08/17/22  0534   POCGLU 397* 271* 205* 188*       Blood Sugar Average: Last 72 hrs:  (P) 617 [id number]     Blood Sugar Average: Last 72 hrs:  (P) 215 13  Treat per DKA protocol  There is evidence of ketosis and only mild acidosis  Degree of hyperglycemia favors hyperglycemic emergency  Regardless, treated the same with aggressive hydration and IV insulin  Follow electrolytes per protocol  No angina, EKG with T wave changes in inferior, and negative HS troponin  UA and CXR without evidence of infection  Previously on metformin alone  Patient expresses hesitancy related to insulin injections  Consult Endocrinology       Hypertension associated with diabetes [facility])  Assessment & Plan  Lab Results   Component Value Date    HGBA1C 12 0 (H) 08/15/2022       Recent Labs     08/16/22 2140 08/16/22  2303 08/16/22  2354 08/17/22  0534   POCGLU 397* 271* 205* 188*       Blood Sugar Average: Last 72 hrs:  (P) 660 [id number]     Blood Sugar Average: Last 72 hrs:  (P) 215 13  ACE-I on hold given LADARIUS  Continue beta blocker and resume norvasc in the morning as patient's BP trending up  Hydralazine PRN SBP >180  Monitor BP trend and give IV prn if needed for BP > 180    Type 2 diabetes mellitus with hyperglycemia, with long-term current use of insulin [facility])  Assessment & Plan  Lab Results   Component Value Date    HGBA1C 12 0 (H) 08/15/2022       Recent Labs     08/16/22  2140 08/16/22  2303 08/16/22  2354 08/17/22  0534   POCGLU 397* 271* 205* 188*       Blood Sugar Average: Last 72 hrs:  (P) 349 [id number]     Blood Sugar Average: Last 72 hrs:  (P) 215 13  See hyperglycemic emergency plan  At family request will consult endocrine given desire for new provider  * Hyperglycemic crisis due to diabetes mellitus [facility])  Assessment & Plan  Lab Results   Component Value Date    HGBA1C 12 0 (H) 08/15/2022       Recent Labs     08/16/22  2140 08/16/22  2303 08/16/22  2354 08/17/22  0534   POCGLU 397* 271* 205* 188*       Blood Sugar Average: Last 72 hrs:  (P) 886 [id number]     Blood Sugar Average: Last 72 hrs:  (P) 215 13  Treat per DKA protocol  There is evidence of ketosis and only mild acidosis  Degree of hyperglycemia favors hyperglycemic emergency  Regardless, treated the same with aggressive hydration and IV insulin  Follow electrolytes per protocol  No angina, EKG with T wave changes in inferior, and negative HS troponin  UA and CXR without evidence of infection  Previously on metformin alone  Patient expresses hesitancy related to insulin injections  Consult Endocrinology  VTE Pharmacologic Prophylaxis:       Patient Centered Rounds: I have performed bedside rounds with nursing staff today      Discussions with Specialists or Other Care Team Provider: Endocrinology    Current Length of Stay: 2 day(s)    Current Patient Status: Inpatient     Discharge Plan / Estimated Discharge Date: Anticipate discharge in 48 hrs to home  Code Status: Level 1 - Full Code      Subjective:   Benjamin Mitchell is a 66 y o  female with a h/o T2DM and HTN, who presented to the ED Monday 8/15/22 with generalized weakness and altered mental status  She had been helping her daughter move and became dehydrated, weak, and confused  Has been taking metformin for DM only  Denies CP, SOB, ROUSSEAU, urinary frequency, urgency, dysuria  Overnight, patient reports no acute events, slept well  Today, she is alert and oriented, and eating breakfast  She feels much better and endorses a good appetite as well as being able to ambulate independently  Denies fever, headache, chest pain, SOB, abdominal pain, or any urinary complaints  Reports no pain anywhere  Review of Systems   Constitutional: Negative for chills and fever  Respiratory: Negative for cough, chest tightness and shortness of breath  Cardiovascular: Negative for chest pain and palpitations  Gastrointestinal: Negative for abdominal pain, nausea and vomiting  Genitourinary: Negative for difficulty urinating and dysuria  Skin: Negative  Neurological: Negative for headaches  Psychiatric/Behavioral: Negative for dysphoric mood and sleep disturbance  The patient is not nervous/anxious  Objective:     Vitals:   Temp (24hrs), Av 2 °F (36 8 °C), Min:98 1 °F (36 7 °C), Max:98 4 °F (36 9 °C)    Temp:  [98 1 °F (36 7 °C)-98 4 °F (36 9 °C)] 98 4 °F (36 9 °C)  HR:  [60-63] 63  Resp:  [21-40] 34  BP: (134-175)/(60-76) 162/72  SpO2:  [93 %-97 %] 96 %  Body mass index is 26 13 kg/m²  Input and Output Summary (last 24 hours):        Intake/Output Summary (Last 24 hours) at 2022 1023  Last data filed at 2022 2124  Gross per 24 hour   Intake 397 17 ml   Output 1050 ml   Net -652 83 ml       Physical Exam:     Physical Exam  Constitutional:       General: She is not in acute distress  Appearance: Normal appearance  HENT:      Head: Normocephalic and atraumatic  Cardiovascular:      Rate and Rhythm: Normal rate and regular rhythm  Heart sounds: Normal heart sounds  No murmur heard  Pulmonary:      Effort: Pulmonary effort is normal  No respiratory distress  Breath sounds: Normal breath sounds  No wheezing, rhonchi or rales  Abdominal:      General: Abdomen is flat  Bowel sounds are normal       Palpations: Abdomen is soft  Tenderness: There is no abdominal tenderness  There is no guarding or rebound  Musculoskeletal:      Cervical back: Normal range of motion and neck supple  Skin:     General: Skin is warm and dry  Neurological:      General: No focal deficit present  Mental Status: She is alert and oriented to person, place, and time  Mental status is at baseline  Psychiatric:         Mood and Affect: Mood normal          Behavior: Behavior normal          Thought Content: Thought content normal           Additional Data:     Labs:  Results from last 7 days   Lab Units 08/17/22  0431 08/15/22  1426 08/15/22  1405   WBC Thousand/uL 7 91   < > 11 24*   HEMOGLOBIN g/dL 12 7   < > 14 0   I STAT HEMOGLOBIN   --    < >  --    HEMATOCRIT % 39 4   < > 44 8   HEMATOCRIT, ISTAT   --    < >  --    PLATELETS Thousands/uL 160   < > 179   NEUTROS PCT %  --   --  89*   LYMPHS PCT %  --   --  9*   MONOS PCT %  --   --  2*   EOS PCT %  --   --  0    < > = values in this interval not displayed       Results from last 7 days   Lab Units 08/17/22  0431 08/16/22  0005 08/15/22  1657   SODIUM mmol/L 139   < > 140   POTASSIUM mmol/L 4 1   < > 3 9   CHLORIDE mmol/L 107   < > 100   CO2 mmol/L 28   < > 20*   BUN mg/dL 26*   < > 47*   CREATININE mg/dL 1 25   < > 2 15*   ANION GAP mmol/L 4   < > 20*   CALCIUM mg/dL 9 2   < > 8 7   ALBUMIN g/dL  --   --  3 8   TOTAL BILIRUBIN mg/dL  --   --  0 29   ALK PHOS U/L  --   -- 72   ALT U/L  --   --  22   AST U/L  --   --  19   GLUCOSE RANDOM mg/dL 207*   < > 517*    < > = values in this interval not displayed       Results from last 7 days   Lab Units 08/15/22  1405   INR  1 27*     Results from last 7 days   Lab Units 08/17/22  0704 08/17/22  0534 08/16/22  2354 08/16/22  2303 08/16/22  2140 08/16/22  1955 08/16/22  1803 08/16/22  1551 08/16/22  1415 08/16/22  1306 08/16/22  1219 08/16/22  1217   POC GLUCOSE mg/dl 194* 188* 205* 271* 397* 389* 237* 175* 199* 127 61* 56*     Results from last 7 days   Lab Units 08/15/22  1614   HEMOGLOBIN A1C % 12 0*     Results from last 7 days   Lab Units 08/16/22  0433 08/15/22  1657   PROCALCITONIN ng/ml 0 62* 0 44*       Imaging: Reviewed radiology reports from this admission including: CT head    Recent Cultures (last 7 days):           Lines/Drains:  Invasive Devices  Report    Peripheral Intravenous Line  Duration           Peripheral IV 08/15/22 Right Antecubital 1 day                Telemetry:   Telemetry Orders (From admission, onward)             24 Hour Telemetry Monitoring  Continuous x 24 Hours (Telem)        References:    Telemetry Guidelines   Question:  Reason for 24 Hour Telemetry  Answer:  Metabolic/Electrolyte Disturbance with High Probability of Dysrhythmia (K level <3 or >6, or KCL infusion >=10mEq/hr)                    Last 24 Hours Medication List:   Current Facility-Administered Medications   Medication Dose Route Frequency Provider Last Rate    amLODIPine  10 mg Oral Daily Mady Holley PA-C      carvedilol  25 mg Oral BID With Meals Mady Holley PA-C      glimepiride  4 mg Oral BID With Meals Jocy Washington MD      heparin (porcine)  5,000 Units Subcutaneous [facility] Rosibel Tatum      hydrALAZINE  5 mg Intravenous Q6H PRN Mady Holley PA-C      insulin glargine  15 Units Subcutaneous HS Jocy Washington MD      insulin lispro  1-5 Units Subcutaneous TID AC Jocy Washington MD      insulin lispro  1-5 Units Subcutaneous  Judah Henry MD      potassium-sodium phosphateS  1 tablet Oral BID With Meals Sharla Price      pravastatin  40 mg Oral Daily With Sharla Moreno      sodium chloride (PF)  3 mL Intravenous Q1H PRN Nia Hayes PA-C          Today, Patient Was Seen By: Darren Khanna    ** Please Note: This note has been constructed using a voice recognition system   **

## 2022-08-17 NOTE — ASSESSMENT & PLAN NOTE
Lab Results   Component Value Date    HGBA1C 12 0 (H) 08/15/2022       Recent Labs     08/16/22  2140 08/16/22  2303 08/16/22  2354 08/17/22  0534   POCGLU 397* 271* 205* 188*       Blood Sugar Average: Last 72 hrs:  (P) 162 [id number]     Blood Sugar Average: Last 72 hrs:  (P) 215 13  ACE-I on hold given LADARIUS  Continue beta blocker and resume norvasc in the morning as patient's BP trending up  Hydralazine PRN SBP >180  Monitor BP trend and give IV prn if needed for BP > 180

## 2022-08-17 NOTE — ASSESSMENT & PLAN NOTE
Lab Results   Component Value Date    HGBA1C 12 0 (H) 08/15/2022       Recent Labs     08/16/22  2303 08/16/22  2354 08/17/22  0534 08/17/22  0704   POCGLU 271* 205* 188* 194*       Blood Sugar Average: Last 72 hrs:  (P) 214 25

## 2022-08-18 VITALS
HEART RATE: 66 BPM | TEMPERATURE: 98.5 F | OXYGEN SATURATION: 96 % | BODY MASS INDEX: 26.29 KG/M2 | WEIGHT: 142.86 LBS | RESPIRATION RATE: 30 BRPM | DIASTOLIC BLOOD PRESSURE: 84 MMHG | SYSTOLIC BLOOD PRESSURE: 152 MMHG | HEIGHT: 62 IN

## 2022-08-18 PROBLEM — G93.41 METABOLIC ENCEPHALOPATHY: Status: ACTIVE | Noted: 2022-08-18

## 2022-08-18 LAB
ANION GAP SERPL CALCULATED.3IONS-SCNC: 7 MMOL/L (ref 4–13)
BASOPHILS # BLD MANUAL: 0.07 THOUSAND/UL (ref 0–0.1)
BASOPHILS NFR MAR MANUAL: 1 % (ref 0–1)
BUN SERPL-MCNC: 21 MG/DL (ref 5–25)
CALCIUM SERPL-MCNC: 8.8 MG/DL (ref 8.3–10.1)
CHLORIDE SERPL-SCNC: 108 MMOL/L (ref 96–108)
CO2 SERPL-SCNC: 27 MMOL/L (ref 21–32)
CREAT SERPL-MCNC: 1.08 MG/DL (ref 0.6–1.3)
EOSINOPHIL # BLD MANUAL: 0.26 THOUSAND/UL (ref 0–0.4)
EOSINOPHIL NFR BLD MANUAL: 4 % (ref 0–6)
ERYTHROCYTE [DISTWIDTH] IN BLOOD BY AUTOMATED COUNT: 13 % (ref 11.6–15.1)
GFR SERPL CREATININE-BSD FRML MDRD: 51 ML/MIN/1.73SQ M
GLUCOSE SERPL-MCNC: 126 MG/DL (ref 65–140)
GLUCOSE SERPL-MCNC: 272 MG/DL (ref 65–140)
GLUCOSE SERPL-MCNC: 69 MG/DL (ref 65–140)
GLUCOSE SERPL-MCNC: 79 MG/DL (ref 65–140)
HCT VFR BLD AUTO: 36.3 % (ref 34.8–46.1)
HGB BLD-MCNC: 11.8 G/DL (ref 11.5–15.4)
LYMPHOCYTES # BLD AUTO: 2.62 THOUSAND/UL (ref 0.6–4.47)
LYMPHOCYTES # BLD AUTO: 40 % (ref 14–44)
MCH RBC QN AUTO: 28.4 PG (ref 26.8–34.3)
MCHC RBC AUTO-ENTMCNC: 32.5 G/DL (ref 31.4–37.4)
MCV RBC AUTO: 88 FL (ref 82–98)
MONOCYTES # BLD AUTO: 0.26 THOUSAND/UL (ref 0–1.22)
MONOCYTES NFR BLD: 4 % (ref 4–12)
NEUTROPHILS # BLD MANUAL: 3.34 THOUSAND/UL (ref 1.85–7.62)
NEUTS SEG NFR BLD AUTO: 51 % (ref 43–75)
PLATELET # BLD AUTO: 145 THOUSANDS/UL (ref 149–390)
PLATELET BLD QL SMEAR: ABNORMAL
PMV BLD AUTO: 14 FL (ref 8.9–12.7)
POTASSIUM SERPL-SCNC: 3.4 MMOL/L (ref 3.5–5.3)
RBC # BLD AUTO: 4.15 MILLION/UL (ref 3.81–5.12)
RBC MORPH BLD: NORMAL
SODIUM SERPL-SCNC: 142 MMOL/L (ref 135–147)
WBC # BLD AUTO: 6.55 THOUSAND/UL (ref 4.31–10.16)

## 2022-08-18 PROCEDURE — 99239 HOSP IP/OBS DSCHRG MGMT >30: CPT | Performed by: INTERNAL MEDICINE

## 2022-08-18 PROCEDURE — 86341 ISLET CELL ANTIBODY: CPT | Performed by: INTERNAL MEDICINE

## 2022-08-18 PROCEDURE — 85007 BL SMEAR W/DIFF WBC COUNT: CPT | Performed by: INTERNAL MEDICINE

## 2022-08-18 PROCEDURE — 83519 RIA NONANTIBODY: CPT | Performed by: INTERNAL MEDICINE

## 2022-08-18 PROCEDURE — 85027 COMPLETE CBC AUTOMATED: CPT | Performed by: INTERNAL MEDICINE

## 2022-08-18 PROCEDURE — 82948 REAGENT STRIP/BLOOD GLUCOSE: CPT

## 2022-08-18 PROCEDURE — 80048 BASIC METABOLIC PNL TOTAL CA: CPT | Performed by: INTERNAL MEDICINE

## 2022-08-18 PROCEDURE — 86337 INSULIN ANTIBODIES: CPT | Performed by: INTERNAL MEDICINE

## 2022-08-18 RX ORDER — INSULIN NPH HUM/REG INSULIN HM 70-30/ML
10 VIAL (ML) SUBCUTANEOUS
Qty: 10 ML | Refills: 0 | Status: SHIPPED | OUTPATIENT
Start: 2022-08-18 | End: 2022-08-18

## 2022-08-18 RX ORDER — HUMAN INSULIN 100 [USP'U]/ML
10 INJECTION, SUSPENSION SUBCUTANEOUS
Qty: 10 ML | Refills: 0 | Status: SHIPPED | OUTPATIENT
Start: 2022-08-18

## 2022-08-18 RX ORDER — GLUCOSAMINE HCL/CHONDROITIN SU 500-400 MG
CAPSULE ORAL
Qty: 100 EACH | Refills: 0 | Status: SHIPPED | OUTPATIENT
Start: 2022-08-18

## 2022-08-18 RX ORDER — INSULIN GLARGINE 100 [IU]/ML
14 INJECTION, SOLUTION SUBCUTANEOUS
Status: DISCONTINUED | OUTPATIENT
Start: 2022-08-18 | End: 2022-08-18 | Stop reason: HOSPADM

## 2022-08-18 RX ORDER — BLOOD SUGAR DIAGNOSTIC
STRIP MISCELLANEOUS
Qty: 100 EACH | Refills: 0 | Status: SHIPPED | OUTPATIENT
Start: 2022-08-18 | End: 2022-10-13 | Stop reason: SDUPTHER

## 2022-08-18 RX ORDER — LANCETS 33 GAUGE
EACH MISCELLANEOUS
Qty: 100 EACH | Refills: 0 | Status: SHIPPED | OUTPATIENT
Start: 2022-08-18

## 2022-08-18 RX ORDER — BLOOD-GLUCOSE METER
KIT MISCELLANEOUS
Qty: 1 KIT | Refills: 0 | Status: SHIPPED | OUTPATIENT
Start: 2022-08-18

## 2022-08-18 RX ORDER — BLOOD SUGAR DIAGNOSTIC
STRIP MISCELLANEOUS
Qty: 100 EACH | Refills: 0 | Status: SHIPPED | OUTPATIENT
Start: 2022-08-18

## 2022-08-18 RX ORDER — BLOOD-GLUCOSE METER
1 KIT MISCELLANEOUS
Qty: 1 EACH | Refills: 0 | Status: SHIPPED | OUTPATIENT
Start: 2022-08-18

## 2022-08-18 RX ADMIN — CARVEDILOL 25 MG: 12.5 TABLET, FILM COATED ORAL at 08:30

## 2022-08-18 RX ADMIN — DIBASIC SODIUM PHOSPHATE, MONOBASIC POTASSIUM PHOSPHATE AND MONOBASIC SODIUM PHOSPHATE 1 TABLET: 852; 155; 130 TABLET ORAL at 08:30

## 2022-08-18 RX ADMIN — HEPARIN SODIUM 5000 UNITS: 5000 INJECTION INTRAVENOUS; SUBCUTANEOUS at 03:15

## 2022-08-18 RX ADMIN — AMLODIPINE BESYLATE 10 MG: 10 TABLET ORAL at 08:30

## 2022-08-18 RX ADMIN — METFORMIN HYDROCHLORIDE 500 MG: 500 TABLET ORAL at 08:30

## 2022-08-18 RX ADMIN — GLIMEPIRIDE 4 MG: 2 TABLET ORAL at 08:30

## 2022-08-18 RX ADMIN — INSULIN LISPRO 3 UNITS: 100 INJECTION, SOLUTION INTRAVENOUS; SUBCUTANEOUS at 12:33

## 2022-08-18 RX ADMIN — INSULIN LISPRO 5 UNITS: 100 INJECTION, SOLUTION INTRAVENOUS; SUBCUTANEOUS at 12:33

## 2022-08-18 NOTE — ASSESSMENT & PLAN NOTE
Lab Results   Component Value Date    EGFR 51 08/18/2022    EGFR 42 08/17/2022    EGFR 41 08/16/2022    CREATININE 1 08 08/18/2022    CREATININE 1 25 08/17/2022    CREATININE 1 28 08/16/2022     Resolved prior to discharge

## 2022-08-18 NOTE — ASSESSMENT & PLAN NOTE
Lab Results   Component Value Date    HGBA1C 12 0 (H) 08/15/2022       Recent Labs     08/17/22  2147 08/18/22  0622 08/18/22  0648 08/18/22  1118   POCGLU 166* 69 126 272*       Blood Sugar Average: Last 72 hrs:  (P) 375 [id number]     Continue statin

## 2022-08-18 NOTE — CASE MANAGEMENT
Case Management Discharge Planning Note    Patient name Verónica Field  Location / MRN [id number]  : 1950 Date 2022       Current Admission Date: 8/15/2022  Current Admission Diagnosis:Hyperglycemic crisis due to diabetes mellitus [facility])   Patient Active Problem List    Diagnosis Date Noted    Metabolic encephalopathy     Type 2 diabetes mellitus with hyperglycemia, with long-term current use of insulin ([facility] ) 08/15/2022    Acute kidney injury superimposed on chronic kidney disease ([facility] ) 08/15/2022    Hyperglycemic crisis due to diabetes mellitus ([facility] ) 2020    [facility] 2018    Hypertension associated with diabetes [facility] 2018    Hyperlipidemia associated with type 2 diabetes mellitus [facility] 2018    Aortic insufficiency 2018      LOS (days): 3  Geometric Mean LOS (GMLOS) (days): 2 90  Days to GMLOS:0 1     OBJECTIVE:  Risk of Unplanned Readmission Score: 12 11         Current admission status: Inpatient   Preferred Pharmacy:   [facility] [address]OS[address] MANUEL HARPER [address] [place] [address] [place] [address]  Phone: [phone] Fax: [phone]    Primary Care Provider: No primary care provider on file  Primary Insurance: Graciela Alcaraz [facility]  Secondary Insurance:     DISCHARGE DETAILS:    Discharge planning discussed with[de-identified] Patient  Freedom of Choice: Yes  Comments - Freedom of Choice: CM met with patient at bedside to review the discharge plan and discuss accepting [address] agencies  Patient prefers [facility]  She also requested the phone number for [employer] to see if her daughter would qualify to be a home health aide for her    CM contacted family/caregiver?: No- see comments (Patient declined phone call at this time )  Were Treatment Team discharge recommendations reviewed with patient/caregiver?: Yes  Did patient/caregiver verbalize understanding of patient care needs?: Yes  Were patient/caregiver advised of the risks associated with not following Treatment Team discharge recommendations?: Yes    [address]         Is the patient interested in [facility] at discharge?: Yes  [address] requested[de-identified] [address] Name[de-identified] [address] Provider[de-identified] PCP  Home Health Services Needed[de-identified] Evaluate Functional Status and Safety, Diabetes Management  Homebound Criteria Met[de-identified] Requires the Assistance of Another Person for Safe Ambulation or to Leave the Home  Supporting Clincal Findings[de-identified] Fatigues Easliy in [employer], [employer]    DME Referral Provided  Referral made for DME?: No    Other Referral/Resources/Interventions Provided:  Interventions: HHC, Prescription Price Check  Referral Comments: CM called [id number] in [place] at [phone] and spoke to Ирина who reported that [employer] will cost the patient $459 after running her insurance  She reported that she is unsure of what alternative patient can use at this time  CM informed [facility] via TT  CM then reviewed [employer]idin and found 3 accepting [facility] agencies  Patient prefers [facility], so CM reserved agency in [facility]  RANJIT then updated AVS to reflect the same  Government Services[de-identified] Area Agency on Aging (Phone number provided on face sheet )    Would you like to participate in our [address] service program?  : No - Declined    Treatment Team Recommendation: Home with [address] [address] [address]  Discharge Destination Plan[de-identified] Home with Joe at Discharge : Family     IMM Given (Date):: 08/18/22  IMM Given to[de-identified] Patient (IMM reviewed with patient  Patient reported understanding of these rights and denied any questions or concerns at this time  Copy of the IMM given to patient   Original signed copy placed in medical records )

## 2022-08-18 NOTE — PLAN OF CARE
Problem: Potential for Falls  Goal: Patient will remain free of falls  Description: INTERVENTIONS:  - Educate patient/family on patient safety including physical limitations  - Instruct patient to call for assistance with activity   - Consult OT/PT to assist with strengthening/mobility   - Keep Call bell within reach  - Keep bed low and locked with side rails adjusted as appropriate  - Keep care items and personal belongings within reach  - Initiate and maintain comfort rounds  - Make Fall Risk Sign visible to staff  - Offer Toileting every 2 Hours, in advance of need  - Initiate/Maintain bed alarm  - Apply yellow socks and bracelet for high fall risk patients  - Consider moving patient to room near nurses station  Outcome: Progressing     Problem: PAIN - ADULT  Goal: Verbalizes/displays adequate comfort level or baseline comfort level  Description: Interventions:  - Encourage patient to monitor pain and request assistance  - Assess pain using appropriate pain scale  - Administer analgesics based on type and severity of pain and evaluate response  - Implement non-pharmacological measures as appropriate and evaluate response  - Consider cultural and social influences on pain and pain management  - Notify physician/advanced practitioner if interventions unsuccessful or patient reports new pain  Outcome: Progressing     Problem: INFECTION - ADULT  Goal: Absence or prevention of progression during hospitalization  Description: INTERVENTIONS:  - Assess and monitor for signs and symptoms of infection  - Monitor lab/diagnostic results  - Monitor all insertion sites, i e  indwelling lines, tubes, and drains  - Monitor endotracheal if appropriate and nasal secretions for changes in amount and color  - [place] appropriate cooling/warming therapies per order  - Administer medications as ordered  - Instruct and encourage patient and family to use good hand hygiene technique  - Identify and instruct in appropriate isolation precautions for identified infection/condition  Outcome: Progressing     Problem: DISCHARGE PLANNING  Goal: Discharge to home or other facility with appropriate resources  Description: INTERVENTIONS:  - Identify barriers to discharge w/patient and caregiver  - Arrange for needed discharge resources and transportation as appropriate  - Identify discharge learning needs (meds, wound care, etc )  - Arrange for interpretive services to assist at discharge as needed  - Refer to Case Management Department for coordinating discharge planning if the patient needs post-hospital services based on physician/advanced practitioner order or complex needs related to functional status, cognitive ability, or social support system  Outcome: Progressing     Problem: Knowledge Deficit  Goal: Patient/family/caregiver demonstrates understanding of disease process, treatment plan, medications, and discharge instructions  Description: Complete learning assessment and assess knowledge base  Interventions:  - Provide teaching at level of understanding  - Provide teaching via preferred learning methods  Outcome: Progressing     Problem: METABOLIC, FLUID AND ELECTROLYTES - ADULT  Goal: Glucose maintained within target range  Description: INTERVENTIONS:  - Monitor Blood Glucose as ordered  - Assess for signs and symptoms of hyperglycemia and hypoglycemia  - Administer ordered medications to maintain glucose within target range  - Assess nutritional intake and initiate nutrition service referral as needed  Outcome: Progressing     Problem: Nutrition/Hydration-ADULT  Goal: Nutrient/Hydration intake appropriate for improving, restoring or maintaining nutritional needs  Description: Monitor and assess patient's nutrition/hydration status for malnutrition  Collaborate with interdisciplinary team and initiate plan and interventions as ordered  Monitor patient's weight and dietary intake as ordered or per policy   Utilize nutrition screening tool and intervene as necessary  Determine patient's food preferences and provide high-protein, high-caloric foods as appropriate       INTERVENTIONS:  - Monitor oral intake, urinary output, labs, and treatment plans  - Assess nutrition and hydration status and recommend course of action  - Evaluate amount of meals eaten  - Assist patient with eating if necessary   - Allow adequate time for meals  - Recommend/ encourage appropriate diets, oral nutritional supplements, and vitamin/mineral supplements  - Order, calculate, and assess calorie counts as needed  - Assess need for intravenous fluids  - Provide nutrition/hydration education as appropriate  - Include patient/family/caregiver in decisions related to nutrition  Outcome: Progressing

## 2022-08-18 NOTE — ASSESSMENT & PLAN NOTE
Lab Results   Component Value Date    HGBA1C 12 0 (H) 08/15/2022       Recent Labs     08/17/22  2147 08/18/22  0622 08/18/22  0648 08/18/22  1118   POCGLU 166* 69 126 272*       Blood Sugar Average: Last 72 hrs:  (P) 435 [id number]     See hyperglycemic emergency plan  Obtain anti-islet cell, glutamic decarboxylase, and anti-insulin antibodies for further work-up of diabetes etiology  Will discharge with a regimen of NPH 10 units by mouth twice a day, in addition to metformin at 500 mg twice a day  Recommendations made to follow-up with endocrinology on discharge

## 2022-08-18 NOTE — QUICK NOTE
Notified by RN, am blood sugar is 69  She was given orange juice with improvement to 126 on recheck  Will reduce evening Lantus from 18U to 14U  Continue to monitor

## 2022-08-18 NOTE — DISCHARGE INSTR - AVS FIRST PAGE
You have been started on an insulin regimen to be administered twice a day via injection  In addition, please continue metformin, with your daily dose increased to 500 mg by mouth twice a day with meals; you may crush the metformin to ease administration  Please continue to take all other medications as prescribed  Please follow-up with endocrinology services on discharge for further management of diabetes

## 2022-08-18 NOTE — CASE MANAGEMENT
Case Management Discharge Planning Note    Patient name Isma Cathy  Location / MRN [id number]  : 1950 Date 2022       Current Admission Date: 8/15/2022  Current Admission Diagnosis:Hyperglycemic crisis due to diabetes mellitus [facility])   Patient Active Problem List    Diagnosis Date Noted    Metabolic encephalopathy 9554    Type 2 diabetes mellitus with hyperglycemia, with long-term current use of insulin ([facility] ) 08/15/2022    Acute kidney injury superimposed on chronic kidney disease ([facility] ) 08/15/2022    Hyperglycemic crisis due to diabetes mellitus ([facility] ) 2020    [facility] 2018    Hypertension associated with diabetes ([facility] ) 2018    Hyperlipidemia associated with type 2 diabetes mellitus [facility] 2018    Aortic insufficiency 2018      LOS (days): 3  Geometric Mean LOS (GMLOS) (days): 2 90  Days to GMLOS:0 1     OBJECTIVE:  Risk of Unplanned Readmission Score: 12 11         Current admission status: Inpatient   Preferred Pharmacy:   [facility] [address] JOS[address] MANU[address] HARPER [address] [place] [address]  Phone: [phone] Fax: [phone]    Primary Care Provider: No primary care provider on file  Primary Insurance: [facility]  Secondary Insurance:     DISCHARGE DETAILS:    Other Referral/Resources/Interventions Provided:  Interventions: Prescription Price Check  Referral Comments: RANJIT called [id number] in [place] again at [phone] and spoke to Demarco Nielson reported that the new script is also not covered by insurance, but they offer their brand for $24 88 per vile  She asked that [facility] sends over Novolin 70/30  RANJIT sent a TT to [facility] with this update

## 2022-08-18 NOTE — ASSESSMENT & PLAN NOTE
Lab Results   Component Value Date    HGBA1C 12 0 (H) 08/15/2022       Recent Labs     08/17/22  2147 08/18/22  0622 08/18/22  0648 08/18/22  1118   POCGLU 166* 69 126 272*       Blood Sugar Average: Last 72 hrs:  (P) 597 [id number]     Continue pre-admission antihypertensive regimen at discharge

## 2022-08-18 NOTE — ASSESSMENT & PLAN NOTE
Likely secondary to hyperglycemia, and in the setting of diabetes mellitus  Present on admission, but resolved as of today's assessment

## 2022-08-18 NOTE — PROGRESS NOTES
[address] [place] [address]  Progress Note - Verónica Field 1950, 66 y o  female MRN: [id number]  Unit/Bed#:  Encounter: [id number]  Primary Care Provider: No primary care provider on file     Date and time admitted to hospital: 8/15/2022  1:34 PM    Acute kidney injury superimposed on chronic kidney disease [facility])  Assessment & Plan  Lab Results   Component Value Date    EGFR 42 08/17/2022    EGFR 41 08/16/2022    EGFR 32 08/16/2022    CREATININE 1 25 08/17/2022    CREATININE 1 28 08/16/2022    CREATININE 1 56 (H) 08/16/2022     Likely d/t diabetic emergency  Continue hydration  Monitor urine output, acid/base status  Consider renal US if no improvement despite adequate hydration  ACE, diuretics currently held but can consider restarting d/t downtrending Cr, today is 1 08 wnl      Aortic insufficiency  Assessment & Plan  Moderate aortic insufficiency noted in 2014 with no f/u since then  Continue to monitor    Hyperlipidemia associated with type 2 diabetes mellitus [facility])  Assessment & Plan  Lab Results   Component Value Date    HGBA1C 12 0 (H) 08/15/2022       Recent Labs     08/16/22  2303 08/16/22  2354 08/17/22  0534 08/17/22  0704   POCGLU 271* 205* 188* 194*       Blood Sugar Average: Last 72 hrs:  (P) 214 25         Hypertension associated with diabetes [facility])  Assessment & Plan  Lab Results   Component Value Date    HGBA1C 12 0 (H) 08/15/2022       Recent Labs     08/16/22  2303 08/16/22  2354 08/17/22  0534 08/17/22  0704   POCGLU 271* 205* 188* 194*       Blood Sugar Average: Last 72 hrs:  (P) 214 25     ACEi on hold given LADARIUS  Continue B-blocker and Norvasc d/t uptrending BP  Hydralazine PRN if SBP > 180    Type 2 diabetes mellitus with hyperglycemia, with long-term current use of insulin [facility])  Assessment & Plan  Lab Results   Component Value Date    HGBA1C 12 0 (H) 08/15/2022       Recent Labs     08/16/22  2303 08/16/22  2354 08/17/22  0534 08/17/22  0704   POCGLU 271* 205* 188* 194*       Blood Sugar Average: Last 72 hrs:  (P) 214 25     See hyperglycemic emergency plan  Obtain anti-islet cell, glutamic decarboxylase, and anti-insulin antibodies for further work-up of diabetes etiology    * Hyperglycemic crisis due to diabetes mellitus [facility])  Assessment & Plan  Lab Results   Component Value Date    HGBA1C 12 0 (H) 08/15/2022       Recent Labs     08/16/22  2303 08/16/22  2354 08/17/22  0534 08/17/22  0704   POCGLU 271* 205* 188* 194*       Blood Sugar Average: Last 72 hrs:  (P) 214 25     Evidence of ketosis and mild acidosis  Degree of hyperglycemia favors hyperglycemic emergency  Treated with aggressive hydration and IV insulin  Continue following electrolytes  No evidence of angina, negative HS troponin  No evidence of infection on UA and CXR   Previously on metformin alone  Restarted metformin yesterday evening  Patient was able to self-administer two doses of insulin yesterday  Consider adding basal insulin to patient's outpatient regimen  VTE Pharmacologic Prophylaxis: Heparin    Patient Centered Rounds: I have performed bedside rounds with nursing staff today  Discussions with Specialists or Other Care Team Provider: Endocrinology    Education and Discussions with Family / Patient:    Current Length of Stay: 3 day(s)    Current Patient Status: Inpatient     Discharge Plan / Estimated Discharge Date: Anticipate discharge tomorrow to home  Code Status: Level 1 - Full Code      Subjective:   Overnight, afebrile with no acute events  Today, she is awake and in good spirits  She reports that she self-administered 2 doses of insulin yesterday and that is was a little scary but she was able to tolerate it well  She endorses good appetite, independent ambulation, and no pain anywhere  She asks when she can be discharged home  She would like to know if there are any opportunities to obtain home health care for her and her       Review of Systems   Constitutional: Negative for chills and fever  Respiratory: Negative for cough and shortness of breath  Cardiovascular: Negative for chest pain and palpitations  Gastrointestinal: Negative for abdominal pain, nausea and vomiting  Genitourinary: Negative  Neurological: Negative for headaches  Psychiatric/Behavioral: Negative for dysphoric mood  The patient is not nervous/anxious  Objective:     Vitals:   Temp (24hrs), Av 1 °F (36 7 °C), Min:97 7 °F (36 5 °C), Max:98 5 °F (36 9 °C)    Temp:  [97 7 °F (36 5 °C)-98 5 °F (36 9 °C)] 98 5 °F (36 9 °C)  HR:  [61-73] 66  Resp:  [20-45] 30  BP: (127-166)/(61-84) 152/84  SpO2:  [96 %-99 %] 96 %  Body mass index is 26 13 kg/m²  Input and Output Summary (last 24 hours):     No intake or output data in the 24 hours ending 22 0912    Physical Exam:     Physical Exam  Constitutional:       General: She is not in acute distress  Appearance: Normal appearance  HENT:      Head: Normocephalic and atraumatic  Cardiovascular:      Rate and Rhythm: Normal rate and regular rhythm  Heart sounds: Normal heart sounds  No murmur heard  Pulmonary:      Effort: Pulmonary effort is normal  No respiratory distress  Breath sounds: Normal breath sounds  Abdominal:      General: Abdomen is flat  Bowel sounds are normal       Palpations: Abdomen is soft  Tenderness: There is no abdominal tenderness  There is no guarding or rebound  Skin:     General: Skin is warm and dry  Neurological:      Mental Status: She is alert and oriented to person, place, and time  Psychiatric:         Mood and Affect: Mood normal          Behavior: Behavior normal          Thought Content:  Thought content normal           Additional Data:     Labs:  Results from last 7 days   Lab Units 22  0534 08/15/22  1426 08/15/22  1405   WBC Thousand/uL 6 55   < > 11 24*   HEMOGLOBIN g/dL 11 8   < > 14 0   I STAT HEMOGLOBIN   --    < >  --    HEMATOCRIT % 36 3   < > 44 8 HEMATOCRIT, ISTAT   --    < >  --    PLATELETS Thousands/uL 145*   < > 179   NEUTROS PCT %  --   --  89*   LYMPHS PCT %  --   --  9*   LYMPHO PCT % 40  --   --    MONOS PCT %  --   --  2*   MONO PCT % 4  --   --    EOS PCT % 4  --  0    < > = values in this interval not displayed  Results from last 7 days   Lab Units 22  0534 22  0005 08/15/22  1657   SODIUM mmol/L 142   < > 140   POTASSIUM mmol/L 3 4*   < > 3 9   CHLORIDE mmol/L 108   < > 100   CO2 mmol/L 27   < > 20*   BUN mg/dL 21   < > 47*   CREATININE mg/dL 1 08   < > 2 15*   ANION GAP mmol/L 7   < > 20*   CALCIUM mg/dL 8 8   < > 8 7   ALBUMIN g/dL  --   --  3 8   TOTAL BILIRUBIN mg/dL  --   --  0 29   ALK PHOS U/L  --   --  72   ALT U/L  --   --  22   AST U/L  --   --  19   GLUCOSE RANDOM mg/dL 79   < > 517*    < > = values in this interval not displayed       Results from last 7 days   Lab Units 08/15/22  1405   INR  1 27*     Results from last 7 days   Lab Units 22  0648 22  0622 22  2147 22  1922 22  1619 22  1114 22  0704 22  0534 22  2354 22  2303 22  2140 22  1955   POC GLUCOSE mg/dl 126 69 166* 322* 337* 321* 194* 188* 205* 271* 397* 389*     Results from last 7 days   Lab Units 08/15/22  1614   HEMOGLOBIN A1C % 12 0*     Results from last 7 days   Lab Units 22  0433 08/15/22  1657   PROCALCITONIN ng/ml 0 62* 0 44*       Imaging: Reviewed radiology reports from this admission including: chest xray and CT head    Recent Cultures (last 7 days):           Lines/Drains:  Invasive Devices  Report    Peripheral Intravenous Line  Duration           Peripheral IV 08/15/22 Right Antecubital 2 days                Telemetry:   Telemetry Orders (From admission, onward)             24 Hour Telemetry Monitoring  Continuous x 24 Hours (Telem)           References:    Telemetry Guidelines   Question:  Reason for 24 Hour Telemetry  Answer:  Metabolic/Electrolyte Disturbance with High Probability of Dysrhythmia (K level <3 or >6, or KCL infusion >=10mEq/hr)                    Last 24 Hours Medication List:   Current Facility-Administered Medications   Medication Dose Route Frequency Provider Last Rate    amLODIPine  10 mg Oral Daily Eyad Lee PA-C      carvedilol  25 mg Oral BID With Meals Eyad Lee PA-C      glimepiride  4 mg Oral BID With Meals Carmen Mcclure MD      heparin (porcine)  5,000 Units Subcutaneous [facility] Eyad Lee [place]      hydrALAZINE  5 mg Intravenous Q6H PRN Eyad Lee PA-C      insulin glargine  14 Units Subcutaneous HS Kathrin Sylvester PA-C      insulin lispro  1-5 Units Subcutaneous TID AC Carmen Mcclure MD      insulin lispro  1-5 Units Subcutaneous HS Carmen Mcclure MD      insulin lispro  5 Units Subcutaneous Daily With Breakfast Alisha Brand MD      insulin lispro  5 Units Subcutaneous Daily With Lunch Alisha Brand MD      insulin lispro  5 Units Subcutaneous Daily With Michelle Guevara MD      metFORMIN  500 mg Oral BID With Meals Carmen Mcclure MD      potassium-sodium phosphateS  1 tablet Oral BID With Meals Eyad Lee PA-C      pravastatin  40 mg Oral Daily With Leanne Bridges PA-C      sodium chloride (PF)  3 mL Intravenous Q1H PRN Eyad Lee PA-C          Today, Patient Was Seen By: Ritika Nolen    ** Please Note: This note has been constructed using a voice recognition system   **

## 2022-08-18 NOTE — PLAN OF CARE
Problem: Potential for Falls  Goal: Patient will remain free of falls  Description: INTERVENTIONS:  - Educate patient/family on patient safety including physical limitations  - Instruct patient to call for assistance with activity   - Consult OT/PT to assist with strengthening/mobility   - Keep Call bell within reach  - Keep bed low and locked with side rails adjusted as appropriate  - Keep care items and personal belongings within reach  - Initiate and maintain comfort rounds  - Make Fall Risk Sign visible to staff  - - Apply yellow socks and bracelet for high fall risk patients  - Consider moving patient to room near nurses station  Outcome: Adequate for Discharge     Problem: PAIN - ADULT  Goal: Verbalizes/displays adequate comfort level or baseline comfort level  Description: Interventions:  - Encourage patient to monitor pain and request assistance  - Assess pain using appropriate pain scale  - Administer analgesics based on type and severity of pain and evaluate response  - Implement non-pharmacological measures as appropriate and evaluate response  - Consider cultural and social influences on pain and pain management  - Notify physician/advanced practitioner if interventions unsuccessful or patient reports new pain  Outcome: Adequate for Discharge     Problem: INFECTION - ADULT  Goal: Absence or prevention of progression during hospitalization  Description: INTERVENTIONS:  - Assess and monitor for signs and symptoms of infection  - Monitor lab/diagnostic results  - Monitor all insertion sites, i e  indwelling lines, tubes, and drains  - Monitor endotracheal if appropriate and nasal secretions for changes in amount and color  - [place] appropriate cooling/warming therapies per order  - Administer medications as ordered  - Instruct and encourage patient and family to use good hand hygiene technique  - Identify and instruct in appropriate isolation precautions for identified infection/condition  Outcome: Adequate for Discharge     Problem: SAFETY ADULT  Goal: Patient will remain free of falls  Description: INTERVENTIONS:  - Educate patient/family on patient safety including physical limitations  - Instruct patient to call for assistance with activity   - Consult OT/PT to assist with strengthening/mobility   - Keep Call bell within reach  - Keep bed low and locked with side rails adjusted as appropriate  - Keep care items and personal belongings within reach  - Initiate and maintain comfort rounds  - Make Fall Risk Sign visible to staff  -- Apply yellow socks and bracelet for high fall risk patients  - Consider moving patient to room near nurses station  Outcome: Adequate for Discharge  Goal: Maintain or return to baseline ADL function  Description: INTERVENTIONS:  -  Assess patient's ability to carry out ADLs; assess patient's baseline for ADL function and identify physical deficits which impact ability to perform ADLs (bathing, care of mouth/teeth, toileting, grooming, dressing, etc )  - Assess/evaluate cause of self-care deficits   - Assess range of motion  - Assess patient's mobility; develop plan if impaired  - Assess patient's need for assistive devices and provide as appropriate  - Encourage maximum independence but intervene and supervise when necessary  - Involve family in performance of ADLs  - Assess for home care needs following discharge   - Consider OT consult to assist with ADL evaluation and planning for discharge  - Provide patient education as appropriate  Outcome: Adequate for Discharge  Goal: Maintains/Returns to pre admission functional level  Description: INTERVENTIONS:  - Perform BMAT or MOVE assessment daily    - Set and communicate daily mobility goal to care team and patient/family/caregiver     - Collaborate with rehabilitation services on mobility goals if consulted  - - Out of bed for toileting  - Record patient progress and toleration of activity level   Outcome: Adequate for Discharge  Problem: Knowledge Deficit  Goal: Patient/family/caregiver demonstrates understanding of disease process, treatment plan, medications, and discharge instructions  Description: Complete learning assessment and assess knowledge base  Interventions:  - Provide teaching at level of understanding  - Provide teaching via preferred learning methods  Outcome: Adequate for Discharge     Problem: METABOLIC, FLUID AND ELECTROLYTES - ADULT  Goal: Glucose maintained within target range  Description: INTERVENTIONS:  - Monitor Blood Glucose as ordered  - Assess for signs and symptoms of hyperglycemia and hypoglycemia  - Administer ordered medications to maintain glucose within target range  - Assess nutritional intake and initiate nutrition service referral as needed  Outcome: Adequate for Discharge     Problem: Nutrition/Hydration-ADULT  Goal: Nutrient/Hydration intake appropriate for improving, restoring or maintaining nutritional needs  Description: Monitor and assess patient's nutrition/hydration status for malnutrition  Collaborate with interdisciplinary team and initiate plan and interventions as ordered  Monitor patient's weight and dietary intake as ordered or per policy  Utilize nutrition screening tool and intervene as necessary  Determine patient's food preferences and provide high-protein, high-caloric foods as appropriate       INTERVENTIONS:  - Monitor oral intake, urinary output, labs, and treatment plans  - Assess nutrition and hydration status and recommend course of action  - Evaluate amount of meals eaten  - Assist patient with eating if necessary   - Allow adequate time for meals  - Recommend/ encourage appropriate diets, oral nutritional supplements, and vitamin/mineral supplements  - Order, calculate, and assess calorie counts as needed  - Assess need for intravenous fluids  - Provide nutrition/hydration education as appropriate  - Include patient/family/caregiver in decisions related to nutrition  Outcome: Adequate for Discharge

## 2022-08-18 NOTE — ASSESSMENT & PLAN NOTE
Lab Results   Component Value Date    HGBA1C 12 0 (H) 08/15/2022       Recent Labs     08/17/22  2147 08/18/22  0622 08/18/22  0648 08/18/22  1118   POCGLU 166* 69 126 272*       Blood Sugar Average: Last 72 hrs:  (P) 533 [id number]     Evidence of ketosis and mild acidosis  Degree of hyperglycemia favors hyperglycemic emergency  Treated with aggressive hydration and IV insulin  Continue following electrolytes  No evidence of angina, negative HS troponin  No evidence of infection on UA and CXR   Previously on metformin alone  Restarted metformin yesterday evening  Patient was able to self-administer two doses of insulin yesterday  Consider adding basal insulin to patient's outpatient regimen

## 2022-08-20 LAB — GAD65 AB SER-ACNC: <5 U/ML (ref 0–5)

## 2022-08-21 LAB
ATRIAL RATE: 67 BPM
ATRIAL RATE: 73 BPM
P AXIS: 78 DEGREES
P AXIS: 79 DEGREES
PR INTERVAL: 150 MS
PR INTERVAL: 156 MS
QRS AXIS: 33 DEGREES
QRS AXIS: 37 DEGREES
QRSD INTERVAL: 86 MS
QRSD INTERVAL: 86 MS
QT INTERVAL: 418 MS
QT INTERVAL: 460 MS
QTC INTERVAL: 460 MS
QTC INTERVAL: 486 MS
T WAVE AXIS: -74 DEGREES
T WAVE AXIS: -74 DEGREES
VENTRICULAR RATE: 67 BPM
VENTRICULAR RATE: 73 BPM

## 2022-08-21 PROCEDURE — 93010 ELECTROCARDIOGRAM REPORT: CPT | Performed by: INTERNAL MEDICINE

## 2022-08-22 LAB — PANC ISLET CELL AB TITR SER: NEGATIVE {TITER}

## 2022-08-25 LAB — INSULIN AB SER-ACNC: <5 UU/ML

## 2022-10-13 RX ORDER — LANCETS 33 GAUGE
EACH MISCELLANEOUS
COMMUNITY
Start: 2022-09-19

## 2022-10-13 RX ORDER — PROCHLORPERAZINE 25 MG/1
1 SUPPOSITORY RECTAL
COMMUNITY
Start: 2022-08-23

## 2022-10-13 RX ORDER — BLOOD SUGAR DIAGNOSTIC
STRIP MISCELLANEOUS
COMMUNITY
Start: 2022-08-18